# Patient Record
Sex: FEMALE | Race: WHITE | NOT HISPANIC OR LATINO
[De-identification: names, ages, dates, MRNs, and addresses within clinical notes are randomized per-mention and may not be internally consistent; named-entity substitution may affect disease eponyms.]

---

## 2021-05-06 ENCOUNTER — TRANSCRIPTION ENCOUNTER (OUTPATIENT)
Age: 51
End: 2021-05-06

## 2021-05-10 ENCOUNTER — NON-APPOINTMENT (OUTPATIENT)
Age: 51
End: 2021-05-10

## 2021-05-10 PROBLEM — Z00.00 ENCOUNTER FOR PREVENTIVE HEALTH EXAMINATION: Noted: 2021-05-10

## 2021-05-25 ENCOUNTER — APPOINTMENT (OUTPATIENT)
Dept: ENDOCRINOLOGY | Facility: CLINIC | Age: 51
End: 2021-05-25
Payer: COMMERCIAL

## 2021-05-25 ENCOUNTER — APPOINTMENT (OUTPATIENT)
Dept: CARDIOLOGY | Facility: CLINIC | Age: 51
End: 2021-05-25
Payer: COMMERCIAL

## 2021-05-25 ENCOUNTER — NON-APPOINTMENT (OUTPATIENT)
Age: 51
End: 2021-05-25

## 2021-05-25 VITALS
HEART RATE: 110 BPM | DIASTOLIC BLOOD PRESSURE: 102 MMHG | OXYGEN SATURATION: 98 % | SYSTOLIC BLOOD PRESSURE: 162 MMHG | BODY MASS INDEX: 41.56 KG/M2 | HEIGHT: 58 IN | WEIGHT: 198 LBS

## 2021-05-25 VITALS
HEART RATE: 107 BPM | WEIGHT: 198 LBS | BODY MASS INDEX: 41.56 KG/M2 | TEMPERATURE: 98.2 F | HEIGHT: 58 IN | SYSTOLIC BLOOD PRESSURE: 145 MMHG | OXYGEN SATURATION: 98 % | DIASTOLIC BLOOD PRESSURE: 85 MMHG

## 2021-05-25 DIAGNOSIS — J45.998 OTHER ASTHMA: ICD-10-CM

## 2021-05-25 DIAGNOSIS — Z83.3 FAMILY HISTORY OF DIABETES MELLITUS: ICD-10-CM

## 2021-05-25 DIAGNOSIS — Z87.891 PERSONAL HISTORY OF NICOTINE DEPENDENCE: ICD-10-CM

## 2021-05-25 DIAGNOSIS — Z87.2 PERSONAL HISTORY OF DISEASES OF THE SKIN AND SUBCUTANEOUS TISSUE: ICD-10-CM

## 2021-05-25 DIAGNOSIS — E07.9 DISORDER OF THYROID, UNSPECIFIED: ICD-10-CM

## 2021-05-25 DIAGNOSIS — Z78.9 OTHER SPECIFIED HEALTH STATUS: ICD-10-CM

## 2021-05-25 PROCEDURE — 99204 OFFICE O/P NEW MOD 45 MIN: CPT

## 2021-05-25 PROCEDURE — 93000 ELECTROCARDIOGRAM COMPLETE: CPT

## 2021-05-25 PROCEDURE — 99072 ADDL SUPL MATRL&STAF TM PHE: CPT

## 2021-05-25 PROCEDURE — 99214 OFFICE O/P EST MOD 30 MIN: CPT

## 2021-05-25 NOTE — HISTORY OF PRESENT ILLNESS
[FreeTextEntry1] : Ms. MARTINEZ  is a 51 year old  female  who presents for initial endocrine evaluation for diabetes type 2 courtesy Dr Sorin King from [facility].She presents with regard to a history of Diabetes since 1993. Maternal grandparents had diabetes. There is no known history of retinopathy, nephropathy. With regard to neuropathy, she does note twitching of left hand. Today's Hba1c returned at  12.3% and POCT bg  194 mg/dl. Patient is currently not on any dm medication. There has been no significant hypoglycemia. She denies any chest pain, sob, neurologic or ophthalmologic complaints. She too denies any new podiatric concerns. She is not  up to date with her ophthalmologic visit.\par May 5 th went to [facility] for shoulder pain radiating to both shoulder, BG was taken 301 mg/ dl . EKG was negative. \par In the past, patient was on metformin in the past, however had a lot of GI issues. Patient was on insulin while attempting  to conceive. ? byetta inj, stopped due increase  sugar craving.\par \par Patient has started intermittent fasting, only having dinner. \par Pt shows interest in Jardiance. \par Admits to adequate intake of water. \par Additional medical history includes that of elevated BP and hypothyroidism. She was on LT4 in the past. \par Placed on labetalol per Dr. Sung. \par Since april 2021- left hand twitches\par \par FMHx: mother had hx of thyroid disease\par \par SHx: works at [employer], does move somewhat throughout the day.

## 2021-05-27 ENCOUNTER — NON-APPOINTMENT (OUTPATIENT)
Age: 51
End: 2021-05-27

## 2021-05-28 ENCOUNTER — APPOINTMENT (OUTPATIENT)
Dept: ENDOCRINOLOGY | Facility: CLINIC | Age: 51
End: 2021-05-28
Payer: COMMERCIAL

## 2021-05-28 ENCOUNTER — NON-APPOINTMENT (OUTPATIENT)
Age: 51
End: 2021-05-28

## 2021-05-28 ENCOUNTER — APPOINTMENT (OUTPATIENT)
Dept: CARDIOLOGY | Facility: CLINIC | Age: 51
End: 2021-05-28
Payer: COMMERCIAL

## 2021-05-28 VITALS — SYSTOLIC BLOOD PRESSURE: 138 MMHG | DIASTOLIC BLOOD PRESSURE: 84 MMHG

## 2021-05-28 VITALS
OXYGEN SATURATION: 98 % | HEIGHT: 58 IN | TEMPERATURE: 98.3 F | SYSTOLIC BLOOD PRESSURE: 124 MMHG | WEIGHT: 198 LBS | BODY MASS INDEX: 41.56 KG/M2 | HEART RATE: 92 BPM | DIASTOLIC BLOOD PRESSURE: 82 MMHG

## 2021-05-28 LAB
ALBUMIN SERPL ELPH-MCNC: 4.5 G/DL
ALDOSTERONE SERUM: 7.3 NG/DL
ALP BLD-CCNC: 91 U/L
ALT SERPL-CCNC: 9 U/L
ANION GAP SERPL CALC-SCNC: 19 MMOL/L
APPEARANCE: CLEAR
AST SERPL-CCNC: 19 U/L
BACTERIA: NEGATIVE
BASOPHILS # BLD AUTO: 0.08 K/UL
BASOPHILS NFR BLD AUTO: 0.5 %
BILIRUB SERPL-MCNC: 0.5 MG/DL
BILIRUBIN URINE: NEGATIVE
BLOOD URINE: NEGATIVE
BUN SERPL-MCNC: 12 MG/DL
CA-I SERPL-SCNC: 1.24 MMOL/L
CALCIUM SERPL-MCNC: 10 MG/DL
CALCIUM SERPL-MCNC: 10.1 MG/DL
CHLORIDE SERPL-SCNC: 101 MMOL/L
CHOLEST SERPL-MCNC: 245 MG/DL
CO2 SERPL-SCNC: 18 MMOL/L
COLOR: YELLOW
COVID-19 NUCLEOCAPSID  GAM ANTIBODY INTERPRETATION: POSITIVE
CREAT SERPL-MCNC: 0.49 MG/DL
CREAT SPEC-SCNC: 77 MG/DL
CREAT/PROT UR: 0.1 RATIO
EOSINOPHIL # BLD AUTO: 0.21 K/UL
EOSINOPHIL NFR BLD AUTO: 1.3 %
ESTIMATED AVERAGE GLUCOSE: 283 MG/DL
GLUCOSE QUALITATIVE U: NEGATIVE
GLUCOSE SERPL-MCNC: 189 MG/DL
HBA1C MFR BLD HPLC: 11.5 %
HCT VFR BLD CALC: 48.9 %
HDLC SERPL-MCNC: 67 MG/DL
HGB BLD-MCNC: 16.5 G/DL
HYALINE CASTS: 3 /LPF
IMM GRANULOCYTES NFR BLD AUTO: 0.3 %
KETONES URINE: ABNORMAL
LDLC SERPL CALC-MCNC: 166 MG/DL
LDLC SERPL DIRECT ASSAY-MCNC: 160 MG/DL
LEUKOCYTE ESTERASE URINE: NEGATIVE
LYMPHOCYTES # BLD AUTO: 3.76 K/UL
LYMPHOCYTES NFR BLD AUTO: 22.5 %
MAGNESIUM SERPL-MCNC: 1.9 MG/DL
MAN DIFF?: NORMAL
MCHC RBC-ENTMCNC: 27.5 PG
MCHC RBC-ENTMCNC: 33.7 GM/DL
MCV RBC AUTO: 81.6 FL
MICROSCOPIC-UA: NORMAL
MONOCYTES # BLD AUTO: 0.89 K/UL
MONOCYTES NFR BLD AUTO: 5.3 %
NEUTROPHILS # BLD AUTO: 11.7 K/UL
NEUTROPHILS NFR BLD AUTO: 70.1 %
NITRITE URINE: NEGATIVE
NONHDLC SERPL-MCNC: 179 MG/DL
PARATHYROID HORMONE INTACT: 31 PG/ML
PH URINE: 6
PLATELET # BLD AUTO: 416 K/UL
POTASSIUM SERPL-SCNC: 3.9 MMOL/L
PROT SERPL-MCNC: 7.5 G/DL
PROT UR-MCNC: 7 MG/DL
PROTEIN URINE: NEGATIVE
RBC # BLD: 5.99 M/UL
RBC # FLD: 12.3 %
RED BLOOD CELLS URINE: 1 /HPF
RENIN PLASMA: 3.8 PG/ML
SARS-COV-2 AB SERPL QL IA: 40.7 INDEX
SODIUM SERPL-SCNC: 138 MMOL/L
SPECIFIC GRAVITY URINE: 1.01
SQUAMOUS EPITHELIAL CELLS: 3 /HPF
T3FREE SERPL-MCNC: 3.39 PG/ML
T4 FREE SERPL-MCNC: 1.4 NG/DL
TRIGL SERPL-MCNC: 64 MG/DL
TSH SERPL-ACNC: 9.34 UIU/ML
UROBILINOGEN URINE: NORMAL
WBC # FLD AUTO: 16.69 K/UL
WHITE BLOOD CELLS URINE: 1 /HPF

## 2021-05-28 PROCEDURE — 99072 ADDL SUPL MATRL&STAF TM PHE: CPT

## 2021-05-28 PROCEDURE — 82962 GLUCOSE BLOOD TEST: CPT

## 2021-05-28 PROCEDURE — 99214 OFFICE O/P EST MOD 30 MIN: CPT

## 2021-05-28 RX ORDER — EMPAGLIFLOZIN AND METFORMIN HYDROCHLORIDE 12.5; 5 MG/1; MG/1
12.5-5 TABLET ORAL
Qty: 90 | Refills: 1 | Status: DISCONTINUED | COMMUNITY
Start: 2021-05-25 | End: 2021-05-28

## 2021-05-28 RX ORDER — LABETALOL HYDROCHLORIDE 200 MG/1
200 TABLET, FILM COATED ORAL
Qty: 60 | Refills: 3 | Status: DISCONTINUED | COMMUNITY
Start: 2021-05-25 | End: 2021-05-28

## 2021-05-28 NOTE — HISTORY OF PRESENT ILLNESS
[FreeTextEntry1] : Pt presents today as a new patient. She was experiencing back pain in the beginning of May and went to urgent care and was advised to see cardiologist because her BP was elevated and EKG showed Sr with PRWP. Pt states she does not have a history of elevated BP. \par Pt has also been experiencing chest discomfort on swallowing for a couple days. She last had an episode 3 weeks ago. Pt has never had an EGD or seen a GI.\par Pt has PMH of DM with Bg of 300 at her urgent care visit. She states she has not been on medication for years. She will be starting with a new PCP Dr. Lombardi in June. Pt also has a history of thryoid disease but has not been on medication for many years. \par Pt had mild covid in January. Did not yet receive covid vaccine. \par

## 2021-06-01 NOTE — HISTORY OF PRESENT ILLNESS
[FreeTextEntry1] : Ms. MARTINEZ is a 51 year old female who presents with regard to a history of Diabetes since 1993. Maternal grandparents had diabetes. There is no known history of retinopathy, nephropathy. With regard to neuropathy, she does note twitching of left hand. Recent Hba1c returned at 12.3%. Patient is currently on glimepiride 2 mg daily since yesterday. She was on Synjardy 12.5-500 mg, however, was unable to tolerate it. There has been no significant hypoglycemia. She denies any chest pain, sob, neurologic or ophthalmologic complaints. She too denies any new podiatric concerns. She is not up to date with her ophthalmologic visit.\par Patient took Synjardy on Wednesday night after dinner, she had diarrhea lasting for about 2 hours several hours [2-3 hours] after taking Synjdary. Too, had cramps and nausea. \par Patient denies GI upset on glimepiride. \par Bg yesterday before dinner 106, 2 hours post-dinner 206. Bg this am 156. \par POCT glucose returned today at 136  mg/dL\par \par In the past, patient was on metformin -however had GI sdie effects and stopped. Patient was on insulin while attempting to conceive. ? did use  byetta inj, stopped due increased sugar craving.\par \par Patient has started intermittent fasting, only having dinner. \par Admits to adequate intake of water. \par \par Labs from 05/25/21 revealed TSH at 9.34, vitamin D 25-OH at 25.1\par \par Additional medical history includes that of elevated BP and hypothyroidism. She was on LT4 in the past. Taking multi-vitmain. \par On labetalol, took Labetalol on Wednesday at 8:45 am and within 2 hours patient noted "tingling pain" of lower abdominal region and of upper femoral region. Patient took another dose on Wednesday night. Has been off since then. Is to discuss with Dr. Sung today.\par \par \par

## 2021-06-03 ENCOUNTER — NON-APPOINTMENT (OUTPATIENT)
Age: 51
End: 2021-06-03

## 2021-06-03 LAB
CORTICOSTEROID BIND GLOBULIN: 2.5 MG/DL
CORTIS SERPL-MCNC: 10 UG/DL
CORTISOL, FREE: 0.45 UG/DL
PFCX: 4.5 %
VIT B6 SERPL-MCNC: 24.7 UG/L

## 2021-06-09 ENCOUNTER — NON-APPOINTMENT (OUTPATIENT)
Age: 51
End: 2021-06-09

## 2021-06-14 ENCOUNTER — APPOINTMENT (OUTPATIENT)
Dept: FAMILY MEDICINE | Facility: CLINIC | Age: 51
End: 2021-06-14
Payer: COMMERCIAL

## 2021-06-14 VITALS
WEIGHT: 197 LBS | HEART RATE: 100 BPM | SYSTOLIC BLOOD PRESSURE: 142 MMHG | OXYGEN SATURATION: 98 % | DIASTOLIC BLOOD PRESSURE: 92 MMHG | BODY MASS INDEX: 41.17 KG/M2

## 2021-06-14 DIAGNOSIS — Z00.00 ENCOUNTER FOR GENERAL ADULT MEDICAL EXAMINATION W/OUT ABNORMAL FINDINGS: ICD-10-CM

## 2021-06-14 DIAGNOSIS — Z83.3 FAMILY HISTORY OF DIABETES MELLITUS: ICD-10-CM

## 2021-06-14 PROCEDURE — 99204 OFFICE O/P NEW MOD 45 MIN: CPT

## 2021-06-14 PROCEDURE — 99072 ADDL SUPL MATRL&STAF TM PHE: CPT

## 2021-06-14 RX ORDER — AZITHROMYCIN 250 MG/1
250 TABLET, FILM COATED ORAL
Qty: 6 | Refills: 0 | Status: COMPLETED | COMMUNITY
Start: 2021-01-15

## 2021-06-14 RX ORDER — METHYLPREDNISOLONE 4 MG/1
4 TABLET ORAL
Qty: 21 | Refills: 0 | Status: COMPLETED | COMMUNITY
Start: 2021-01-15

## 2021-06-14 RX ORDER — GUAIFENESIN AND CODEINE PHOSPHATE 10; 100 MG/5ML; MG/5ML
100-10 SOLUTION ORAL
Qty: 300 | Refills: 0 | Status: COMPLETED | COMMUNITY
Start: 2021-01-15

## 2021-06-14 NOTE — ASSESSMENT
[FreeTextEntry1] : CAD\par seeing cardiology, getting calcium score this week\par echocardiogram next week\par seeing cardiology regularly\par \par elevated CBC\par going to hematology july \par \par hypertension\par The patient has a diagnosis of hypertension. Blood work was drawn in office and will be followed.  The diagnosis was discussed with patient and need for medication compliance and possible side affects and risks of noncompliance. Patient was told to adhere to a low salt diet and try to incorporate exercise daily.\par still elevated, increase to 5 and reassess\par \par diabetes\par The diagnosis of diabetes is established with this patient. Previous blood work was reviewed prior to the visit and with the patient at time of visit.  Blood work values were explained as well.  Blood work was drawn in office and results will be reviewed and followed. The patient was counseled on using a low sugar and carbohydrate diet.  Patient was advised to eat small meals high in protein and to exercise regularly. Patient as advised to take all medications as prescribed, compliance with medications was reinforced.  Patient should follow up with yearly podiatry and opthalmology visits. Patient was advised to call office  or go to the ER immediately if experiences any nausea, lightheadedness or for any other issues.\par was diabetic, weaned off all her meds, then just restarted with endo\par a1c is 11.5\par does not do well with metformin\par \par cholesterol\par The diagnosis of high cholesterol is established and known to the patient and provider.  Previous blood work was evaluated at time of visit and reviewed with the patient. Blood work was drawn in office and results will be reviewed and followed. The patient was counseled on a low cholesterol diet and on medication compliance. Patient was advised to generally limit foods fried in oil, high in saturated fat, cheese, eggs and red meat. Patient was advised to continue on current medications and to followup in office for necessary blood work in three months.\par reviewed bw with patient - on crestor 5\par \par thyroid\par Patient had a diagnosis of thyroid disorder. Blood was drawn to assess levels of TSH and T4. Patient will continue on current dose of medications at this time unless instructed otherwise. Patient was advised to take thyroid medications on a empty stomach and to wait at least 45 minutes before eating for appropriate absorption.\par was not on medications, now back on synthroid

## 2021-06-14 NOTE — HEALTH RISK ASSESSMENT
[Very Good] : ~his/her~  mood as very good [] : No [30 or more] : 30 or more [Yes] : Yes [No falls in past year] : Patient reported no falls in the past year [0] : 2) Feeling down, depressed, or hopeless: Not at all (0) [YearQuit] : 2004 [TZA9Njomb] : 0 [de-identified] : rarely [With Significant Other] : lives with significant other [Employed] : employed [] :  [# Of Children ___] : has [unfilled] children [Fully functional (bathing, dressing, toileting, transferring, walking, feeding)] : Fully functional (bathing, dressing, toileting, transferring, walking, feeding) [Fully functional (using the telephone, shopping, preparing meals, housekeeping, doing laundry, using] : Fully functional and needs no help or supervision to perform IADLs (using the telephone, shopping, preparing meals, housekeeping, doing laundry, using transportation, managing medications and managing finances) [de-identified] : [facility] [place] lab

## 2021-06-14 NOTE — HISTORY OF PRESENT ILLNESS
[de-identified] : 51 year old female here to establish care and for a full physical examination. The patients complete medical, family and social history was documented and reviewed with the patient.  The patients medications were identified and also reviewed with the patient as well as any allergies to any medications. All active and previous medical problems were identified and discussed with the patient.  Any new problems were documented. \par

## 2021-06-17 ENCOUNTER — APPOINTMENT (OUTPATIENT)
Dept: CT IMAGING | Facility: CLINIC | Age: 51
End: 2021-06-17
Payer: SELF-PAY

## 2021-06-17 ENCOUNTER — OUTPATIENT (OUTPATIENT)
Dept: OUTPATIENT SERVICES | Facility: HOSPITAL | Age: 51
LOS: 1 days | End: 2021-06-17
Payer: SELF-PAY

## 2021-06-17 DIAGNOSIS — E78.5 HYPERLIPIDEMIA, UNSPECIFIED: ICD-10-CM

## 2021-06-17 PROCEDURE — 75571 CT HRT W/O DYE W/CA TEST: CPT | Mod: 26

## 2021-06-17 PROCEDURE — 75571 CT HRT W/O DYE W/CA TEST: CPT

## 2021-06-17 NOTE — HISTORY OF PRESENT ILLNESS
[FreeTextEntry1] : Pt presents for acute visit. States that she started Labetalol 200 mg on Wednesday, and about two hours after taking the medication she started experiencing a tingling sensation in her groin and her legs. Pt has been holding Labetalol since yesterday. Pt states tingling sensation stopped when she stopped the medication. Pt denies any chest pain, shortness of breath, dizziness, headache, palpitations, cough or fever. Pt took her BP this morning which was 124/82. \par Pt had elevated WBC on CBC, pt states this is chronic for her but she has never seen a hematologist.\par Chol. 245, -pt not currently on meds\par TSH 9.34, A1C 11.5-Pt started on medication- followed by Dr. Randle

## 2021-06-21 ENCOUNTER — APPOINTMENT (OUTPATIENT)
Dept: CARDIOLOGY | Facility: CLINIC | Age: 51
End: 2021-06-21
Payer: COMMERCIAL

## 2021-06-21 ENCOUNTER — APPOINTMENT (OUTPATIENT)
Dept: ENDOCRINOLOGY | Facility: CLINIC | Age: 51
End: 2021-06-21
Payer: COMMERCIAL

## 2021-06-21 ENCOUNTER — NON-APPOINTMENT (OUTPATIENT)
Age: 51
End: 2021-06-21

## 2021-06-21 VITALS
SYSTOLIC BLOOD PRESSURE: 125 MMHG | HEIGHT: 58 IN | DIASTOLIC BLOOD PRESSURE: 90 MMHG | HEART RATE: 97 BPM | BODY MASS INDEX: 41.35 KG/M2 | OXYGEN SATURATION: 98 % | TEMPERATURE: 97.6 F | WEIGHT: 197 LBS

## 2021-06-21 VITALS — SYSTOLIC BLOOD PRESSURE: 124 MMHG | DIASTOLIC BLOOD PRESSURE: 84 MMHG

## 2021-06-21 VITALS
TEMPERATURE: 97.6 F | WEIGHT: 197 LBS | HEART RATE: 97 BPM | BODY MASS INDEX: 41.35 KG/M2 | DIASTOLIC BLOOD PRESSURE: 90 MMHG | HEIGHT: 58 IN | OXYGEN SATURATION: 98 % | SYSTOLIC BLOOD PRESSURE: 125 MMHG

## 2021-06-21 DIAGNOSIS — I10 ESSENTIAL (PRIMARY) HYPERTENSION: ICD-10-CM

## 2021-06-21 DIAGNOSIS — R79.89 OTHER SPECIFIED ABNORMAL FINDINGS OF BLOOD CHEMISTRY: ICD-10-CM

## 2021-06-21 LAB
METANEPHRINE, PL: 37.9 PG/ML
NORMETANEPHRINE, PL: 237 PG/ML

## 2021-06-21 PROCEDURE — 95250 CONT GLUC MNTR PHYS/QHP EQP: CPT

## 2021-06-21 PROCEDURE — 99072 ADDL SUPL MATRL&STAF TM PHE: CPT

## 2021-06-21 PROCEDURE — 93306 TTE W/DOPPLER COMPLETE: CPT

## 2021-06-21 PROCEDURE — 93000 ELECTROCARDIOGRAM COMPLETE: CPT

## 2021-06-21 PROCEDURE — 99214 OFFICE O/P EST MOD 30 MIN: CPT

## 2021-06-21 NOTE — HISTORY OF PRESENT ILLNESS
[FreeTextEntry1] : Ms. MARTINEZ is a 51 year old female who returns ith regard to a history of type 2  Diabetes since 1993. There is no known history of retinopathy, nephropathy. With regard to neuropathy, she does note twitching of left hand.Patient is currently on glimepiride 2 mg daily and Jardiance 10 mg daily-unable to tolerate Synjardy.There has been no significant hypoglycemia. She denies any chest pain, sob, neurologic or ophthalmologic complaints. She too denies any new podiatric concerns. She is not up to date with her ophthalmologic visit.\par Patient denies GI upset on glimepiride. \par In the past, patient was on metformin -however had GI sdie effects and stopped. Patient was on insulin while attempting to conceive. ? did use  byetta inj, stopped due increased sugar craving.\par Patient has followed intermittent fasting, only having dinner. \par Admits to adequate intake of water. \par Labs from 05/25/21 revealed TSH at 9.34, vitamin D 25-OH at 25.1 Now on LT4 50 mcg and wekly vit d.\par Additional medical history includes that of elevated BP and hypothyroidism. DOes take the LT4 noted above along with Amlodipine -couldn't tolerate Labetalol.\par \par \par

## 2021-06-22 LAB
ALBUMIN SERPL ELPH-MCNC: 4.5 G/DL
ALP BLD-CCNC: 75 U/L
ALT SERPL-CCNC: 12 U/L
ANION GAP SERPL CALC-SCNC: 12 MMOL/L
AST SERPL-CCNC: 15 U/L
BASOPHILS # BLD AUTO: 0.06 K/UL
BASOPHILS NFR BLD AUTO: 0.4 %
BILIRUB DIRECT SERPL-MCNC: 0.2 MG/DL
BILIRUB INDIRECT SERPL-MCNC: 0.4 MG/DL
BILIRUB SERPL-MCNC: 0.5 MG/DL
BUN SERPL-MCNC: 10 MG/DL
CALCIUM SERPL-MCNC: 9.9 MG/DL
CHLORIDE SERPL-SCNC: 105 MMOL/L
CHOLEST SERPL-MCNC: 128 MG/DL
CK SERPL-CCNC: 26 U/L
CO2 SERPL-SCNC: 22 MMOL/L
COVID-19 NUCLEOCAPSID  GAM ANTIBODY INTERPRETATION: POSITIVE
CREAT SERPL-MCNC: 0.49 MG/DL
EOSINOPHIL # BLD AUTO: 0.21 K/UL
EOSINOPHIL NFR BLD AUTO: 1.4 %
ESTIMATED AVERAGE GLUCOSE: 217 MG/DL
GLUCOSE SERPL-MCNC: 79 MG/DL
HBA1C MFR BLD HPLC: 9.2 %
HCT VFR BLD CALC: 46.8 %
HDLC SERPL-MCNC: 45 MG/DL
HGB BLD-MCNC: 15.2 G/DL
IMM GRANULOCYTES NFR BLD AUTO: 0.4 %
LDLC SERPL CALC-MCNC: 73 MG/DL
LDLC SERPL DIRECT ASSAY-MCNC: 63 MG/DL
LYMPHOCYTES # BLD AUTO: 3.46 K/UL
LYMPHOCYTES NFR BLD AUTO: 22.3 %
MAN DIFF?: NORMAL
MCHC RBC-ENTMCNC: 27.2 PG
MCHC RBC-ENTMCNC: 32.5 GM/DL
MCV RBC AUTO: 83.9 FL
MONOCYTES # BLD AUTO: 0.8 K/UL
MONOCYTES NFR BLD AUTO: 5.2 %
NEUTROPHILS # BLD AUTO: 10.93 K/UL
NEUTROPHILS NFR BLD AUTO: 70.3 %
NONHDLC SERPL-MCNC: 83 MG/DL
PLATELET # BLD AUTO: 371 K/UL
POTASSIUM SERPL-SCNC: 3.7 MMOL/L
PROT SERPL-MCNC: 7.2 G/DL
RBC # BLD: 5.58 M/UL
RBC # FLD: 13 %
SARS-COV-2 AB SERPL QL IA: 33.6 INDEX
SODIUM SERPL-SCNC: 139 MMOL/L
T3FREE SERPL-MCNC: 2.53 PG/ML
T4 FREE SERPL-MCNC: 1.5 NG/DL
TRIGL SERPL-MCNC: 52 MG/DL
TSH SERPL-ACNC: 3.67 UIU/ML
WBC # FLD AUTO: 15.52 K/UL

## 2021-06-30 ENCOUNTER — APPOINTMENT (OUTPATIENT)
Dept: FAMILY MEDICINE | Facility: CLINIC | Age: 51
End: 2021-06-30
Payer: COMMERCIAL

## 2021-06-30 ENCOUNTER — OUTPATIENT (OUTPATIENT)
Dept: OUTPATIENT SERVICES | Facility: HOSPITAL | Age: 51
LOS: 1 days | End: 2021-06-30
Payer: COMMERCIAL

## 2021-06-30 ENCOUNTER — APPOINTMENT (OUTPATIENT)
Dept: RADIOLOGY | Facility: IMAGING CENTER | Age: 51
End: 2021-06-30
Payer: COMMERCIAL

## 2021-06-30 ENCOUNTER — TRANSCRIPTION ENCOUNTER (OUTPATIENT)
Age: 51
End: 2021-06-30

## 2021-06-30 ENCOUNTER — RESULT REVIEW (OUTPATIENT)
Age: 51
End: 2021-06-30

## 2021-06-30 VITALS
OXYGEN SATURATION: 97 % | DIASTOLIC BLOOD PRESSURE: 94 MMHG | SYSTOLIC BLOOD PRESSURE: 129 MMHG | BODY MASS INDEX: 40.51 KG/M2 | HEART RATE: 97 BPM | WEIGHT: 193 LBS | HEIGHT: 58 IN

## 2021-06-30 DIAGNOSIS — M94.0 CHONDROCOSTAL JUNCTION SYNDROME [TIETZE]: ICD-10-CM

## 2021-06-30 PROCEDURE — 71100 X-RAY EXAM RIBS UNI 2 VIEWS: CPT | Mod: 26

## 2021-06-30 PROCEDURE — 99072 ADDL SUPL MATRL&STAF TM PHE: CPT

## 2021-06-30 PROCEDURE — 99213 OFFICE O/P EST LOW 20 MIN: CPT

## 2021-06-30 PROCEDURE — 71100 X-RAY EXAM RIBS UNI 2 VIEWS: CPT

## 2021-06-30 RX ORDER — IBUPROFEN 800 MG/1
800 TABLET, FILM COATED ORAL
Qty: 30 | Refills: 0 | Status: ACTIVE | COMMUNITY
Start: 2021-06-30 | End: 1900-01-01

## 2021-06-30 RX ORDER — ERGOCALCIFEROL 1.25 MG/1
1.25 MG CAPSULE, LIQUID FILLED ORAL
Qty: 4 | Refills: 2 | Status: DISCONTINUED | COMMUNITY
Start: 2021-05-28 | End: 2021-06-30

## 2021-06-30 NOTE — PHYSICAL EXAM
[Well Nourished] : well nourished [Well Developed] : well developed [Clear to Auscultation] : lungs were clear to auscultation bilaterally [Regular Rhythm] : with a regular rhythm [Normal S1, S2] : normal S1 and S2 [Normal Gait] : normal gait [Normal Affect] : the affect was normal [Normal Insight/Judgement] : insight and judgment were intact [de-identified] : tenderness to left lower ribs

## 2021-06-30 NOTE — HEALTH RISK ASSESSMENT
[] : No [30 or more] : 30 or more [Yes] : Yes [No falls in past year] : Patient reported no falls in the past year [0] : 2) Feeling down, depressed, or hopeless: Not at all (0) [YearQuit] : 2004 [de-identified] : rarely [[id number]] : 0 [Very Good] : ~his/her~  mood as very good [With Significant Other] : lives with significant other [Employed] : employed [] :  [# Of Children ___] : has [unfilled] children [Fully functional (bathing, dressing, toileting, transferring, walking, feeding)] : Fully functional (bathing, dressing, toileting, transferring, walking, feeding) [Fully functional (using the telephone, shopping, preparing meals, housekeeping, doing laundry, using] : Fully functional and needs no help or supervision to perform IADLs (using the telephone, shopping, preparing meals, housekeeping, doing laundry, using transportation, managing medications and managing finances) [de-identified] : [facility] lab

## 2021-06-30 NOTE — ASSESSMENT
[FreeTextEntry1] : left sided rib pain\par most likely costochondritis\par rib series\par nsaids\par \par CAD\par seeing cardiology, getting calcium score this week\par echocardiogram next week\par seeing cardiology regularly\par \par elevated CBC\par going to hematology july \par \par hypertension\par The patient has a diagnosis of hypertension. Blood work was drawn in office and will be followed.  The diagnosis was discussed with patient and need for medication compliance and possible side affects and risks of noncompliance. Patient was told to adhere to a low salt diet and try to incorporate exercise daily.\par still elevated, increase to 5 and reassess\par \par diabetes\par The diagnosis of diabetes is established with this patient. Previous blood work was reviewed prior to the visit and with the patient at time of visit.  Blood work values were explained as well.  Blood work was drawn in office and results will be reviewed and followed. The patient was counseled on using a low sugar and carbohydrate diet.  Patient was advised to eat small meals high in protein and to exercise regularly. Patient as advised to take all medications as prescribed, compliance with medications was reinforced.  Patient should follow up with yearly podiatry and opthalmology visits. Patient was advised to call office  or go to the ER immediately if experiences any nausea, lightheadedness or for any other issues.\par was diabetic, weaned off all her meds, then just restarted with endo\par a1c is 9.2\par does not do well with metformin\par \par cholesterol\par The diagnosis of high cholesterol is established and known to the patient and provider.  Previous blood work was evaluated at time of visit and reviewed with the patient. Blood work was drawn in office and results will be reviewed and followed. The patient was counseled on a low cholesterol diet and on medication compliance. Patient was advised to generally limit foods fried in oil, high in saturated fat, cheese, eggs and red meat. Patient was advised to continue on current medications and to followup in office for necessary blood work in three months.\par reviewed bw with patient - on crestor 5\par \par thyroid\par Patient had a diagnosis of thyroid disorder. Blood was drawn to assess levels of TSH and T4. Patient will continue on current dose of medications at this time unless instructed otherwise. Patient was advised to take thyroid medications on a empty stomach and to wait at least 45 minutes before eating for appropriate absorption.\par was not on medications, now back on synthroid

## 2021-06-30 NOTE — HISTORY OF PRESENT ILLNESS
[de-identified] : 51 year old female here with complaints of left sided rib pain for a few months, intermittent, no rhyme or reason.  Patients active medications, allergies and issues were all reviewed with the patient at time of visit.\par \par

## 2021-07-06 ENCOUNTER — OUTPATIENT (OUTPATIENT)
Dept: OUTPATIENT SERVICES | Facility: HOSPITAL | Age: 51
LOS: 1 days | Discharge: HOME | End: 2021-07-06

## 2021-07-06 ENCOUNTER — APPOINTMENT (OUTPATIENT)
Dept: ENDOCRINOLOGY | Facility: CLINIC | Age: 51
End: 2021-07-06

## 2021-07-06 DIAGNOSIS — R79.89 OTHER SPECIFIED ABNORMAL FINDINGS OF BLOOD CHEMISTRY: ICD-10-CM

## 2021-07-07 ENCOUNTER — LABORATORY RESULT (OUTPATIENT)
Age: 51
End: 2021-07-07

## 2021-07-07 ENCOUNTER — APPOINTMENT (OUTPATIENT)
Dept: HEMATOLOGY ONCOLOGY | Facility: CLINIC | Age: 51
End: 2021-07-07
Payer: COMMERCIAL

## 2021-07-07 VITALS
HEART RATE: 98 BPM | OXYGEN SATURATION: 98 % | SYSTOLIC BLOOD PRESSURE: 130 MMHG | HEIGHT: 59.29 IN | DIASTOLIC BLOOD PRESSURE: 80 MMHG | BODY MASS INDEX: 39.21 KG/M2 | WEIGHT: 197.09 LBS | RESPIRATION RATE: 18 BRPM | TEMPERATURE: 98.2 F

## 2021-07-07 PROCEDURE — 99072 ADDL SUPL MATRL&STAF TM PHE: CPT

## 2021-07-07 PROCEDURE — 99205 OFFICE O/P NEW HI 60 MIN: CPT

## 2021-07-07 NOTE — ASSESSMENT
[FreeTextEntry1] : 52 yo F with a PMH DM2 (not on insulin), PCOS, HTN, HLD, psoriasis, and hypothyroidism who is seen in consult with leukocytosis and elevated H/H.\par \par # Leukocytosis\par - Chronic, about 15-16K, likely due to PCOS\par - Explained to patient, no further separate management\par - Weight control\par \par # Polycythemia\par - Hb about 15-16\par - Current unknown etiology, r/o malignant causes\par - Will perform iron studies, ferritin, check for PV, hemochromatosis mutation \par \par - RTC in 2-3 months, PRE-F \par

## 2021-07-07 NOTE — HISTORY OF PRESENT ILLNESS
[de-identified] : 50 yo F with a PMH DM2 (not on insulin), PCOS, HTN, HLD, psoriasis, and hypothyroidism who presents with leukocytosis and elevated Hb. She states she has had chronic leukocytosis, usually around 15, since the 1990s. She has more recently noticed elevated Hb. She denies headache or blurry vision, recent significant weight changes, itching, joint pains, fevers, or night sweats. She also has no personal or family history of blood clots or abnormal bleeding. Her hypothyroidism is controlled (normal TSH) and her diabetes is improving.

## 2021-07-07 NOTE — REASON FOR VISIT
[Initial Consultation] : an initial consultation for [Leukocytosis] : leukocytosis [FreeTextEntry2] : Cytosis

## 2021-07-07 NOTE — PHYSICAL EXAM
[Fully active, able to carry on all pre-disease performance without restriction] : Status 0 - Fully active, able to carry on all pre-disease performance without restriction [Obese] : obese [Normal] : affect appropriate [Ulcers] : no ulcers [Mucositis] : no mucositis [Thrush] : no thrush [de-identified] : Dry mucous membranes

## 2021-07-07 NOTE — RESULTS/DATA
[FreeTextEntry1] : 7/7/2021\par Available labs reviewed. \par Pancytosis  on 5/25/21 labs. \par Leukocytosis only in 6/21/2021 labs.

## 2021-07-12 ENCOUNTER — NON-APPOINTMENT (OUTPATIENT)
Age: 51
End: 2021-07-12

## 2021-07-14 RX ORDER — BLOOD SUGAR DIAGNOSTIC
STRIP MISCELLANEOUS 3 TIMES DAILY
Qty: 3 | Refills: 3 | Status: DISCONTINUED | COMMUNITY
Start: 2021-05-25 | End: 2021-07-14

## 2021-07-14 NOTE — HISTORY OF PRESENT ILLNESS
[FreeTextEntry1] : Ms. MARTINEZ is a 51 year old female who returns ith regard to a history of type 2 Diabetes since 1993. There is no known history of retinopathy, nephropathy. With regard to neuropathy, she does note twitching of left hand.Patient is currently on glimepiride 2 mg daily and Jardiance 10 mg daily-unable to tolerate Synjardy. Patient denies GI upset on glimepiride. In the past, patient was on metformin -however had GI sdie effects and stopped. Patient was on insulin while attempting to conceive. ? did use byetta inj, stopped due increased sugar cravingThere has been no significant hypoglycemia. She denies any chest pain, sob, neurologic or ophthalmologic complaints. She too denies any new podiatric concerns. She is not up to date with her ophthalmologic visit. HGM has been running in the 160s in the am,  70s-80s at around 5 pm, 180s 2 hrs after eating. 68 was her lowest value. Patient has followed intermittent fasting, only having dinner for the past 6-8 months.  Admits to adequate intake of water. \par Labs from 05/25/21 revealed TSH at 9.34, vitamin D 25-OH at 25.1 Now on LT4 50 mcg and wekly vit d.\par 5/25/2021 A1c returned at 11.5%\par POCT glucose returned today at  136  mg/dL \par Additional medical history includes that of elevated BP and hypothyroidism. DOes take the LT4 noted above along with Amlodipine -couldn't tolerate Labetalol.

## 2021-07-15 RX ORDER — ASPIRIN ENTERIC COATED TABLETS 81 MG 81 MG/1
81 TABLET, DELAYED RELEASE ORAL DAILY
Refills: 0 | Status: ACTIVE | COMMUNITY
Start: 2021-07-15

## 2021-07-16 ENCOUNTER — NON-APPOINTMENT (OUTPATIENT)
Age: 51
End: 2021-07-16

## 2021-07-16 RX ORDER — CHOLECALCIFEROL (VITAMIN D3) 1250 MCG
1.25 MG CAPSULE ORAL
Qty: 12 | Refills: 0 | Status: DISCONTINUED | COMMUNITY
Start: 2021-07-06 | End: 2021-07-16

## 2021-07-19 ENCOUNTER — APPOINTMENT (OUTPATIENT)
Dept: ORTHOPEDIC SURGERY | Facility: CLINIC | Age: 51
End: 2021-07-19
Payer: COMMERCIAL

## 2021-07-19 ENCOUNTER — NON-APPOINTMENT (OUTPATIENT)
Age: 51
End: 2021-07-19

## 2021-07-19 PROCEDURE — 99072 ADDL SUPL MATRL&STAF TM PHE: CPT

## 2021-07-19 PROCEDURE — 99204 OFFICE O/P NEW MOD 45 MIN: CPT

## 2021-07-28 ENCOUNTER — APPOINTMENT (OUTPATIENT)
Dept: PHYSICAL MEDICINE AND REHAB | Facility: CLINIC | Age: 51
End: 2021-07-28
Payer: COMMERCIAL

## 2021-07-28 DIAGNOSIS — M99.02 SEGMENTAL AND SOMATIC DYSFUNCTION OF THORACIC REGION: ICD-10-CM

## 2021-07-28 DIAGNOSIS — M99.08 SEGMENTAL AND SOMATIC DYSFUNCTION OF RIB CAGE: ICD-10-CM

## 2021-07-28 DIAGNOSIS — M99.07 SEGMENTAL AND SOMATIC DYSFUNCTION OF UPPER EXTREMITY: ICD-10-CM

## 2021-07-28 PROCEDURE — 98926 OSTEOPATH MANJ 3-4 REGIONS: CPT

## 2021-07-28 PROCEDURE — 99072 ADDL SUPL MATRL&STAF TM PHE: CPT

## 2021-07-28 PROCEDURE — 99213 OFFICE O/P EST LOW 20 MIN: CPT | Mod: 25

## 2021-07-28 PROCEDURE — 99203 OFFICE O/P NEW LOW 30 MIN: CPT | Mod: 25

## 2021-07-28 NOTE — PHYSICAL EXAM
[FreeTextEntry1] : Gen: Patient is A&O x 3, NAD\par HEENT: EOMI, hearing grossly normal\par Resp: regular, non - labored\par CV: pulses regular\par Skin: no rashes, erythema\par Lymph: no clubbing, cyanosis, edema, or palpable lymphadenopathy\par Inspection: Posterior thoracic rotation along T6-T7\par ROM: full throughout\par Palpation: TTP left intercostal space T5, T6, T7\par Sensation: intact to light touch\par Reflexes: 1+ and symmetric throughout\par Strength: 5/5 throughout\par Special tests: -Yeboah's sign\par Gait: normal, non-antalgic\par \par Osteopathic Structural Exam:\par Thoracic: TT6-T8 NRLSBR\par Rib: Left ribs 5-7 restricted in exhalation, TTP left anterior rib 6, 7 \par Upper extremity: Left GH joint restricted anterior \par \par \par Physical exam performed with Chaperone in the room.

## 2021-07-28 NOTE — ASSESSMENT
[FreeTextEntry1] : 51 year old female presenting for left chest wall/rib pain.\par \par #Costochondritis \par -Sports medicine notes reviewed.  \par -X-ray of ribs reviewed.\par -Planning for MRI to further evaluate.\par -Discussed risks and benefits of osteopathic manipulation.\par -Osteopathic structural exam demonstrated somatic dysfunction and the patient agreed to osteopathic manipulation.\par  \par \par 1. Somatic dysfunction upper extremity\par --OMT performed with muscle energy \par 2. Somatic dysfunction rib\par --OMT performed with myofascial release, muscle energy and counterstrain \par 3. Somatic dysfunction thoracic \par --OMT performed with muscle energy and myofascial release \par \par Patient tolerated treatment well.\par \par Follow up in 1-2 weeks.  \par \par

## 2021-07-28 NOTE — DATA REVIEWED
[FreeTextEntry1] : x-ray ribs left June 2021: No acute appearing displaced rib fractures or other gross rib pathology.

## 2021-07-28 NOTE — HISTORY OF PRESENT ILLNESS
[FreeTextEntry1] : Ms. Poole is a 51 year old female who presents with left sided rib pain right underneath her breast line. Pain started suddenly in April 2021 with no inciting events, sometime radiates towards sternum, sometimes posteriorly. Pain initially comes and goes, but now near constant.  It is sharp/burning pressure pain. She was evaluated by her PMD who dx her w/ Costochondritis, as rib xrays were negative for fractures or gulshan pathologies, and cardiac work up was unremarkable. She was started on 10 day course of Ibuprofen 800mg TID with no improvement.  She was then started on steroids and is taking tramadol as needed for pain.\par \par She denies any weakness, numbness or tingling or bowel/bladder problems.

## 2021-08-01 ENCOUNTER — EMERGENCY (EMERGENCY)
Facility: HOSPITAL | Age: 51
LOS: 0 days | Discharge: HOME | End: 2021-08-01
Attending: EMERGENCY MEDICINE
Payer: COMMERCIAL

## 2021-08-01 VITALS
SYSTOLIC BLOOD PRESSURE: 145 MMHG | RESPIRATION RATE: 16 BRPM | DIASTOLIC BLOOD PRESSURE: 83 MMHG | HEART RATE: 105 BPM | TEMPERATURE: 98 F | HEIGHT: 59 IN | OXYGEN SATURATION: 98 % | WEIGHT: 195.11 LBS

## 2021-08-01 VITALS
DIASTOLIC BLOOD PRESSURE: 78 MMHG | HEART RATE: 85 BPM | OXYGEN SATURATION: 99 % | SYSTOLIC BLOOD PRESSURE: 135 MMHG | RESPIRATION RATE: 18 BRPM | TEMPERATURE: 99 F

## 2021-08-01 DIAGNOSIS — Z88.1 ALLERGY STATUS TO OTHER ANTIBIOTIC AGENTS STATUS: ICD-10-CM

## 2021-08-01 DIAGNOSIS — E28.2 POLYCYSTIC OVARIAN SYNDROME: ICD-10-CM

## 2021-08-01 DIAGNOSIS — E03.9 HYPOTHYROIDISM, UNSPECIFIED: ICD-10-CM

## 2021-08-01 DIAGNOSIS — R07.9 CHEST PAIN, UNSPECIFIED: ICD-10-CM
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
## 2021-08-01 DIAGNOSIS — S39.012A STRAIN OF MUSCLE, FASCIA AND TENDON OF LOWER BACK, INITIAL ENCOUNTER: ICD-10-CM
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
## 2021-08-01 DIAGNOSIS — I10 ESSENTIAL (PRIMARY) HYPERTENSION: ICD-10-CM

## 2021-08-01 DIAGNOSIS — E11.9 TYPE 2 DIABETES MELLITUS WITHOUT COMPLICATIONS: ICD-10-CM

## 2021-08-01 LAB
ALBUMIN SERPL ELPH-MCNC: 3.4 G/DL — SIGNIFICANT CHANGE UP (ref 3.3–5)
ALP SERPL-CCNC: 70 U/L — SIGNIFICANT CHANGE UP (ref 40–120)
ALT FLD-CCNC: 19 U/L — SIGNIFICANT CHANGE UP (ref 12–78)
ANION GAP SERPL CALC-SCNC: 4 MMOL/L — LOW (ref 5–17)
APPEARANCE UR: CLEAR — SIGNIFICANT CHANGE UP
AST SERPL-CCNC: 10 U/L — LOW (ref 15–37)
BACTERIA # UR AUTO: ABNORMAL
BASOPHILS # BLD AUTO: 0.06 K/UL — SIGNIFICANT CHANGE UP (ref 0–0.2)
BASOPHILS NFR BLD AUTO: 0.4 % — SIGNIFICANT CHANGE UP (ref 0–2)
BILIRUB SERPL-MCNC: 0.3 MG/DL — SIGNIFICANT CHANGE UP (ref 0.2–1.2)
BILIRUB UR-MCNC: NEGATIVE — SIGNIFICANT CHANGE UP
BUN SERPL-MCNC: 16 MG/DL — SIGNIFICANT CHANGE UP (ref 7–23)
CALCIUM SERPL-MCNC: 9 MG/DL — SIGNIFICANT CHANGE UP (ref 8.5–10.1)
CHLORIDE SERPL-SCNC: 112 MMOL/L — HIGH (ref 96–108)
CO2 SERPL-SCNC: 28 MMOL/L — SIGNIFICANT CHANGE UP (ref 22–31)
COLOR SPEC: YELLOW — SIGNIFICANT CHANGE UP
CREAT SERPL-MCNC: 0.67 MG/DL — SIGNIFICANT CHANGE UP (ref 0.5–1.3)
DIFF PNL FLD: ABNORMAL
EOSINOPHIL # BLD AUTO: 0.28 K/UL — SIGNIFICANT CHANGE UP (ref 0–0.5)
EOSINOPHIL NFR BLD AUTO: 1.8 % — SIGNIFICANT CHANGE UP (ref 0–6)
EPI CELLS # UR: SIGNIFICANT CHANGE UP
GLUCOSE SERPL-MCNC: 156 MG/DL — HIGH (ref 70–99)
GLUCOSE UR QL: 1000 MG/DL
HCT VFR BLD CALC: 43 % — SIGNIFICANT CHANGE UP (ref 34.5–45)
HGB BLD-MCNC: 14.2 G/DL — SIGNIFICANT CHANGE UP (ref 11.5–15.5)
IMM GRANULOCYTES NFR BLD AUTO: 0.5 % — SIGNIFICANT CHANGE UP (ref 0–1.5)
KETONES UR-MCNC: NEGATIVE — SIGNIFICANT CHANGE UP
LEUKOCYTE ESTERASE UR-ACNC: NEGATIVE — SIGNIFICANT CHANGE UP
LYMPHOCYTES # BLD AUTO: 17.5 % — SIGNIFICANT CHANGE UP (ref 13–44)
LYMPHOCYTES # BLD AUTO: 2.67 K/UL — SIGNIFICANT CHANGE UP (ref 1–3.3)
MCHC RBC-ENTMCNC: 27.7 PG — SIGNIFICANT CHANGE UP (ref 27–34)
MCHC RBC-ENTMCNC: 33 GM/DL — SIGNIFICANT CHANGE UP (ref 32–36)
MCV RBC AUTO: 84 FL — SIGNIFICANT CHANGE UP (ref 80–100)
MONOCYTES # BLD AUTO: 0.91 K/UL — HIGH (ref 0–0.9)
MONOCYTES NFR BLD AUTO: 6 % — SIGNIFICANT CHANGE UP (ref 2–14)
NEUTROPHILS # BLD AUTO: 11.23 K/UL — HIGH (ref 1.8–7.4)
NEUTROPHILS NFR BLD AUTO: 73.8 % — SIGNIFICANT CHANGE UP (ref 43–77)
NITRITE UR-MCNC: NEGATIVE — SIGNIFICANT CHANGE UP
NRBC # BLD: 0 /100 WBCS — SIGNIFICANT CHANGE UP (ref 0–0)
PH UR: 6 — SIGNIFICANT CHANGE UP (ref 5–8)
PLATELET # BLD AUTO: 344 K/UL — SIGNIFICANT CHANGE UP (ref 150–400)
POTASSIUM SERPL-MCNC: 3.9 MMOL/L — SIGNIFICANT CHANGE UP (ref 3.5–5.3)
POTASSIUM SERPL-SCNC: 3.9 MMOL/L — SIGNIFICANT CHANGE UP (ref 3.5–5.3)
PROT SERPL-MCNC: 6.9 GM/DL — SIGNIFICANT CHANGE UP (ref 6–8.3)
PROT UR-MCNC: NEGATIVE MG/DL — SIGNIFICANT CHANGE UP
RBC # BLD: 5.12 M/UL — SIGNIFICANT CHANGE UP (ref 3.8–5.2)
RBC # FLD: 12.8 % — SIGNIFICANT CHANGE UP (ref 10.3–14.5)
RBC CASTS # UR COMP ASSIST: >50 /HPF (ref 0–4)
SODIUM SERPL-SCNC: 144 MMOL/L — SIGNIFICANT CHANGE UP (ref 135–145)
SP GR SPEC: 1.01 — SIGNIFICANT CHANGE UP (ref 1.01–1.02)
TROPONIN I SERPL-MCNC: <.015 NG/ML — SIGNIFICANT CHANGE UP (ref 0.01–0.04)
UROBILINOGEN FLD QL: NEGATIVE MG/DL — SIGNIFICANT CHANGE UP
WBC # BLD: 15.22 K/UL — HIGH (ref 3.8–10.5)
WBC # FLD AUTO: 15.22 K/UL — HIGH (ref 3.8–10.5)
WBC UR QL: SIGNIFICANT CHANGE UP

## 2021-08-01 PROCEDURE — 71045 X-RAY EXAM CHEST 1 VIEW: CPT | Mod: 26

## 2021-08-01 PROCEDURE — 72100 X-RAY EXAM L-S SPINE 2/3 VWS: CPT | Mod: 26

## 2021-08-01 PROCEDURE — 99285 EMERGENCY DEPT VISIT HI MDM: CPT

## 2021-08-01 RX ORDER — KETOROLAC TROMETHAMINE 30 MG/ML
30 SYRINGE (ML) INJECTION ONCE
Refills: 0 | Status: DISCONTINUED | OUTPATIENT
Start: 2021-08-01 | End: 2021-08-01

## 2021-08-01 RX ORDER — METHOCARBAMOL 500 MG/1
2 TABLET, FILM COATED ORAL
Qty: 30 | Refills: 0
Start: 2021-08-01 | End: 2021-08-05

## 2021-08-01 RX ORDER — TRAMADOL HYDROCHLORIDE 50 MG/1
1 TABLET ORAL
Qty: 12 | Refills: 0
Start: 2021-08-01 | End: 2021-08-03

## 2021-08-01 RX ADMIN — Medication 30 MILLIGRAM(S): at 16:54

## 2021-08-01 NOTE — ED ADULT NURSE NOTE - FINAL NURSING ELECTRONIC SIGNATURE
83-year-old female with intrathoracic stomach, hiatal hernia with Nish's ulcers found in March 2021, Presented with dyspnea on exertion, found with normocytic anemia with hemoglobin of 7.1 MCV of 88.6 on admission. ? recurrent bleeding from [place] ulcers/HH?    -Check Iron panel, Vitamin B12 level, folate level, reticulocyte, TSH (ordered)  -Regular diet  -No plans for repeat upper endoscopy at this time  -Transfuse as needed to a goal hemoglobin 7-8  - PPI BID  -anti-reflux precautions  -Hematology evaluation    Aylin Albarado MD  Gastroenterology  [facility], [place], operated by [facility]  [address] [place] [id number] [phone]     01-Aug-2021 17:59

## 2021-08-01 NOTE — ED PROVIDER NOTE - CLINICAL SUMMARY MEDICAL DECISION MAKING FREE TEXT BOX
pt with likely lumbosacral strain as cause of lower back pain secondary to manipulation of back possibly but otherwise no signs of trauma or issues on xray spine. Will dc with robaxin/tramadol.

## 2021-08-01 NOTE — ED PROVIDER NOTE - OBJECTIVE STATEMENT
Pt is a 52 y/o female with PMH of DM, polycystic ovaries, HLD, HTN, and hypothyroidism presenting to the ED for evaluation of left lower back pain. Pt reports onset of left sided chest pain in 04/2021 prompting her to see PCP who diagnosed her with costochondritis. Pt noted to feel pain with movement with radiation to back and saw orthopedist. Pt then reports seeing new orthopedist who performed manipulation. She states that since then she has constant throbbing ache 2-3/10 in severity and occasional sharp pain 6/10. Pt notes she has been taking Tramadol recently but has not taken today. Pt also reports taking levothyroxine, Aspirin, Statin, and Amlodipine. Pt reports she is followed by cardiologist who performed EKG, echo, and stress test which all reportedly returned normal.

## 2021-08-01 NOTE — ED ADULT TRIAGE NOTE - CHIEF COMPLAINT QUOTE
Chest Pain left sided since April, seen for costochondritis and FU with ortho for ribs, painful on movement, left sided CP radiating to back, increase pain since manipulation by Doctor

## 2021-08-01 NOTE — ED ADULT NURSE NOTE - IN THE PAST 12 MONTHS HAVE YOU USED DRUGS OTHER THAN THOSE REQUIRED FOR MEDICAL REASON?
Letter of Determination: Upgrade from Observation to Inpatient Status    This patient was originally hospitalized as Outpatient Status with Observation Services on 5/14/2018 for intractable pain. This patient now meets for Inpatient Admission based on medical necessity. The patient's stay was medically necessary based on multiple neoplastic disease with prostate, colon, and thyroid cancer and multiple myeloma, requiring 6 doses of intravenous narcotic medication with morphine at 2 mg and 10 mg doses, and hydromorphone at 1 mg within the first 24 hours. It is our recommendation that this patient's hospitalization status should be upgraded from OBSERVATION to INPATIENT status.      The final decision regarding the patient's hospitalization status depends on the attending physician's judgement.     Constance Xiao MD, [facility],   Physician [facility].
No

## 2021-08-01 NOTE — ED PROVIDER NOTE - PATIENT PORTAL LINK FT
You can access the FollowMyHealth Patient Portal offered by [facility] by registering at the following website: [email or web address]. By joining [employer]’s FollowMyHealth portal, you will also be able to view your health information using other applications (apps) compatible with our system.

## 2021-08-03 LAB
CULTURE RESULTS: SIGNIFICANT CHANGE UP
SPECIMEN SOURCE: SIGNIFICANT CHANGE UP

## 2021-08-10 ENCOUNTER — APPOINTMENT (OUTPATIENT)
Dept: FAMILY MEDICINE | Facility: CLINIC | Age: 51
End: 2021-08-10
Payer: COMMERCIAL

## 2021-08-10 VITALS
DIASTOLIC BLOOD PRESSURE: 75 MMHG | HEIGHT: 59 IN | SYSTOLIC BLOOD PRESSURE: 128 MMHG | WEIGHT: 195 LBS | HEART RATE: 97 BPM | TEMPERATURE: 96.9 F | BODY MASS INDEX: 39.31 KG/M2 | OXYGEN SATURATION: 97 %

## 2021-08-10 DIAGNOSIS — M54.5 LOW BACK PAIN: ICD-10-CM

## 2021-08-10 PROCEDURE — 99214 OFFICE O/P EST MOD 30 MIN: CPT

## 2021-08-10 NOTE — ASSESSMENT
[FreeTextEntry1] : left sided rib pain\par most likely costochondritis\par rib series\par nsaids\par reviewed note from PM&R\par \par back pain\par Patient was advised to rest and to use moist heat throughout the day and stretch as appropriate. Patient was advised to take all medications as prescribed. Patient was advised if symptoms persist or worsen return to office.\par was acute on 8/1/21, feeling 75% better with muscle relaxer\par \par CAD\par seeing cardiology, getting calcium score this week\par echocardiogram next week\par seeing cardiology regularly\par \par elevated CBC\par going to hematology july \par \par hypertension\par The patient has a diagnosis of hypertension. Blood work was drawn in office and will be followed.  The diagnosis was discussed with patient and need for medication compliance and possible side affects and risks of noncompliance. Patient was told to adhere to a low salt diet and try to incorporate exercise daily.\par still elevated, increase to 5 and reassess\par \par diabetes\par The diagnosis of diabetes is established with this patient. Previous blood work was reviewed prior to the visit and with the patient at time of visit.  Blood work values were explained as well.  Blood work was drawn in office and results will be reviewed and followed. The patient was counseled on using a low sugar and carbohydrate diet.  Patient was advised to eat small meals high in protein and to exercise regularly. Patient as advised to take all medications as prescribed, compliance with medications was reinforced.  Patient should follow up with yearly podiatry and opthalmology visits. Patient was advised to call office  or go to the ER immediately if experiences any nausea, lightheadedness or for any other issues.\par was diabetic, weaned off all her meds, then just restarted with endo\par a1c is 9.2\par does not do well with metformin\par \par cholesterol\par The diagnosis of high cholesterol is established and known to the patient and provider.  Previous blood work was evaluated at time of visit and reviewed with the patient. Blood work was drawn in office and results will be reviewed and followed. The patient was counseled on a low cholesterol diet and on medication compliance. Patient was advised to generally limit foods fried in oil, high in saturated fat, cheese, eggs and red meat. Patient was advised to continue on current medications and to followup in office for necessary blood work in three months.\par reviewed bw with patient - on crestor 5\par \par thyroid\par Patient had a diagnosis of thyroid disorder. Blood was drawn to assess levels of TSH and T4. Patient will continue on current dose of medications at this time unless instructed otherwise. Patient was advised to take thyroid medications on a empty stomach and to wait at least 45 minutes before eating for appropriate absorption.\par was not on medications, now back on synthroid

## 2021-08-10 NOTE — HISTORY OF PRESENT ILLNESS
[de-identified] : 51 year old female here with complaints of left sided rib pain for a few months, intermittent, no rhyme or reason.  \par \par then went to see PM&R, and then had severe back pain the next day and went to the ER on 8/1/21\par had diagnosis of lower back sprain\par \par Patients active medications, allergies and issues were all reviewed with the patient at time of visit.\par \par

## 2021-08-10 NOTE — HEALTH RISK ASSESSMENT
[] : No [Yes] : Yes [No falls in past year] : Patient reported no falls in the past year [0] : 2) Feeling down, depressed, or hopeless: Not at all (0) [YearQuit] : 2004 [de-identified] : rarely [[id number]] : 0 [Very Good] : ~his/her~  mood as very good [With Significant Other] : lives with significant other [Employed] : employed [] :  [# Of Children ___] : has [unfilled] children [Fully functional (bathing, dressing, toileting, transferring, walking, feeding)] : Fully functional (bathing, dressing, toileting, transferring, walking, feeding) [Fully functional (using the telephone, shopping, preparing meals, housekeeping, doing laundry, using] : Fully functional and needs no help or supervision to perform IADLs (using the telephone, shopping, preparing meals, housekeeping, doing laundry, using transportation, managing medications and managing finances) [de-identified] : [facility] lab

## 2021-08-10 NOTE — PHYSICAL EXAM
[Well Nourished] : well nourished [Well Developed] : well developed [Clear to Auscultation] : lungs were clear to auscultation bilaterally [Regular Rhythm] : with a regular rhythm [Normal S1, S2] : normal S1 and S2 [Normal Gait] : normal gait [Normal Affect] : the affect was normal [Normal Insight/Judgement] : insight and judgment were intact [de-identified] : tenderness to left lower ribs

## 2021-08-11 LAB
25(OH)D3 SERPL-MCNC: 25.1 NG/ML
25(OH)D3 SERPL-MCNC: 34 NG/ML
FRUCTOSAMINE SERPL-MCNC: 415 UMOL/L
GLUCOSE BLDC GLUCOMTR-MCNC: 136
GLUCOSE BLDC GLUCOMTR-MCNC: 194
GLYCOMARK.: 1.4 UG/ML
HBA1C MFR BLD HPLC: 12.3
THYROGLOB AB SERPL-ACNC: <20 IU/ML
THYROPEROXIDASE AB SERPL IA-ACNC: <10 IU/ML
VIT B12 SERPL-MCNC: 743 PG/ML

## 2021-08-13 ENCOUNTER — OUTPATIENT (OUTPATIENT)
Dept: OUTPATIENT SERVICES | Facility: HOSPITAL | Age: 51
LOS: 1 days | End: 2021-08-13
Payer: COMMERCIAL

## 2021-08-13 ENCOUNTER — APPOINTMENT (OUTPATIENT)
Dept: MRI IMAGING | Facility: CLINIC | Age: 51
End: 2021-08-13
Payer: COMMERCIAL

## 2021-08-13 DIAGNOSIS — Z00.8 ENCOUNTER FOR OTHER GENERAL EXAMINATION: ICD-10-CM

## 2021-08-13 PROCEDURE — 71550 MRI CHEST W/O DYE: CPT | Mod: 26

## 2021-08-13 PROCEDURE — 71550 MRI CHEST W/O DYE: CPT

## 2021-08-23 ENCOUNTER — APPOINTMENT (OUTPATIENT)
Dept: ORTHOPEDIC SURGERY | Facility: CLINIC | Age: 51
End: 2021-08-23
Payer: COMMERCIAL

## 2021-08-23 PROCEDURE — 99442: CPT

## 2021-09-08 ENCOUNTER — APPOINTMENT (OUTPATIENT)
Dept: ENDOCRINOLOGY | Facility: CLINIC | Age: 51
End: 2021-09-08
Payer: COMMERCIAL

## 2021-09-08 PROCEDURE — 95251 CONT GLUC MNTR ANALYSIS I&R: CPT

## 2021-09-09 ENCOUNTER — APPOINTMENT (OUTPATIENT)
Dept: INTERNAL MEDICINE | Facility: CLINIC | Age: 51
End: 2021-09-09
Payer: COMMERCIAL

## 2021-09-09 VITALS
HEIGHT: 59 IN | BODY MASS INDEX: 39.31 KG/M2 | WEIGHT: 195 LBS | OXYGEN SATURATION: 98 % | SYSTOLIC BLOOD PRESSURE: 122 MMHG | TEMPERATURE: 98 F | HEART RATE: 94 BPM | DIASTOLIC BLOOD PRESSURE: 76 MMHG

## 2021-09-09 DIAGNOSIS — K80.20 CALCULUS OF GALLBLADDER W/OUT CHOLECYSTITIS W/OUT OBSTRUCTION: ICD-10-CM

## 2021-09-09 PROCEDURE — 99204 OFFICE O/P NEW MOD 45 MIN: CPT

## 2021-09-09 NOTE — HISTORY OF PRESENT ILLNESS
[FreeTextEntry1] : Began having discomfort left side of abdomen chest. felt was costochondritis. Went orthopedist - made matters worse. Went for MRI which found gallstones. \par  she has PCOS She has no symptoms, regarding gallstones .\par She never had a colonoscopy. She has been doing intermittent fasting for last 2 years . She had been on BCP in past.  No children. \par She also needds to have a screening colonoscopy.

## 2021-09-09 NOTE — PHYSICAL EXAM
[General Appearance - Alert] : alert [General Appearance - In No Acute Distress] : in no acute distress [Sclera] : the sclera and conjunctiva were normal [PERRL With Normal Accommodation] : pupils were equal in size, round, and reactive to light [Extraocular Movements] : extraocular movements were intact [Outer Ear] : the ears and nose were normal in appearance [Oropharynx] : the oropharynx was normal [Neck Appearance] : the appearance of the neck was normal [Neck Cervical Mass (___cm)] : no neck mass was observed [Jugular Venous Distention Increased] : there was no jugular-venous distention [Thyroid Diffuse Enlargement] : the thyroid was not enlarged [Thyroid Nodule] : there were no palpable thyroid nodules [Auscultation Breath Sounds / Voice Sounds] : lungs were clear to auscultation bilaterally [Heart Rate And Rhythm] : heart rate was normal and rhythm regular [Heart Sounds] : normal S1 and S2 [Heart Sounds Gallop] : no gallops [Murmurs] : no murmurs [Heart Sounds Pericardial Friction Rub] : no pericardial rub [Full Pulse] : the pedal pulses are present [Edema] : there was no peripheral edema [Bowel Sounds] : normal bowel sounds [Abdomen Soft] : soft [Abdomen Mass (___ Cm)] : no abdominal mass palpated [External Female Genitalia] : normal external genitalia [Urethral Meatus] : normal urethra [Urinary Bladder Findings] : the bladder was normal on palpation [No CVA Tenderness] : no ~M costovertebral angle tenderness [No Spinal Tenderness] : no spinal tenderness [Nail Clubbing] : no clubbing  or cyanosis of the fingernails [Abnormal Walk] : normal gait [Musculoskeletal - Swelling] : no joint swelling seen [Motor Tone] : muscle strength and tone were normal [Skin Color & Pigmentation] : normal skin color and pigmentation [Skin Turgor] : normal skin turgor [] : no rash [Deep Tendon Reflexes (DTR)] : deep tendon reflexes were 2+ and symmetric [Sensation] : the sensory exam was normal to light touch and pinprick [No Focal Deficits] : no focal deficits [Oriented To Time, Place, And Person] : oriented to person, place, and time [Impaired Insight] : insight and judgment were intact [Affect] : the affect was normal [FreeTextEntry1] : RUQ tenderness, mild [place] sign

## 2021-09-17 ENCOUNTER — OUTPATIENT (OUTPATIENT)
Dept: OUTPATIENT SERVICES | Facility: HOSPITAL | Age: 51
LOS: 1 days | Discharge: HOME | End: 2021-09-17

## 2021-09-17 DIAGNOSIS — R79.89 OTHER SPECIFIED ABNORMAL FINDINGS OF BLOOD CHEMISTRY: ICD-10-CM

## 2021-09-20 ENCOUNTER — RESULT REVIEW (OUTPATIENT)
Age: 51
End: 2021-09-20

## 2021-09-20 ENCOUNTER — APPOINTMENT (OUTPATIENT)
Dept: HEMATOLOGY ONCOLOGY | Facility: CLINIC | Age: 51
End: 2021-09-20
Payer: COMMERCIAL

## 2021-09-20 VITALS
WEIGHT: 198.86 LBS | HEART RATE: 84 BPM | RESPIRATION RATE: 18 BRPM | DIASTOLIC BLOOD PRESSURE: 77 MMHG | BODY MASS INDEX: 40.09 KG/M2 | TEMPERATURE: 97.8 F | OXYGEN SATURATION: 99 % | HEIGHT: 59 IN | SYSTOLIC BLOOD PRESSURE: 119 MMHG

## 2021-09-20 LAB
BASOPHILS # BLD AUTO: 0.09 K/UL — SIGNIFICANT CHANGE UP (ref 0–0.2)
BASOPHILS NFR BLD AUTO: 0.6 % — SIGNIFICANT CHANGE UP (ref 0–2)
EOSINOPHIL # BLD AUTO: 0.26 K/UL — SIGNIFICANT CHANGE UP (ref 0–0.5)
EOSINOPHIL NFR BLD AUTO: 1.6 % — SIGNIFICANT CHANGE UP (ref 0–6)
HCT VFR BLD CALC: 43.5 % — SIGNIFICANT CHANGE UP (ref 34.5–45)
HGB BLD-MCNC: 14.7 G/DL — SIGNIFICANT CHANGE UP (ref 11.5–15.5)
IMM GRANULOCYTES NFR BLD AUTO: 0.9 % — SIGNIFICANT CHANGE UP (ref 0–1.5)
LYMPHOCYTES # BLD AUTO: 22.3 % — SIGNIFICANT CHANGE UP (ref 13–44)
LYMPHOCYTES # BLD AUTO: 3.6 K/UL — HIGH (ref 1–3.3)
MCHC RBC-ENTMCNC: 27.1 PG — SIGNIFICANT CHANGE UP (ref 27–34)
MCHC RBC-ENTMCNC: 33.8 G/DL — SIGNIFICANT CHANGE UP (ref 32–36)
MCV RBC AUTO: 80.3 FL — SIGNIFICANT CHANGE UP (ref 80–100)
MONOCYTES # BLD AUTO: 0.84 K/UL — SIGNIFICANT CHANGE UP (ref 0–0.9)
MONOCYTES NFR BLD AUTO: 5.2 % — SIGNIFICANT CHANGE UP (ref 2–14)
NEUTROPHILS # BLD AUTO: 11.2 K/UL — HIGH (ref 1.8–7.4)
NEUTROPHILS NFR BLD AUTO: 69.4 % — SIGNIFICANT CHANGE UP (ref 43–77)
NRBC # BLD: 0 /100 WBCS — SIGNIFICANT CHANGE UP (ref 0–0)
PLATELET # BLD AUTO: 344 K/UL — SIGNIFICANT CHANGE UP (ref 150–400)
RBC # BLD: 5.42 M/UL — HIGH (ref 3.8–5.2)
RBC # FLD: 12.3 % — SIGNIFICANT CHANGE UP (ref 10.3–14.5)
WBC # BLD: 16.13 K/UL — HIGH (ref 3.8–10.5)
WBC # FLD AUTO: 16.13 K/UL — HIGH (ref 3.8–10.5)

## 2021-09-20 PROCEDURE — 99213 OFFICE O/P EST LOW 20 MIN: CPT

## 2021-09-21 NOTE — HISTORY OF PRESENT ILLNESS
[de-identified] : CHART REVIEW: 50 yo F with a PMH DM2 (not on insulin), PCOS, HTN, HLD, psoriasis, and hypothyroidism who presents with leukocytosis and elevated Hb. She states she has had chronic leukocytosis, usually around 15, since the 1990s. She has more recently noticed elevated Hb. She denies headache or blurry vision, recent significant weight changes, itching, joint pains, fevers, or night sweats. She also has no personal or family history of blood clots or abnormal bleeding. Her hypothyroidism is controlled (normal TSH) and her diabetes is improving. [de-identified] : 9/20/2021 \par Patient seen in follow up for pancytosis. she was last seen in 07/2021 when her H/H and platelets were WNL, WBC was boarder line high. She edorses no change in her health history.

## 2021-09-21 NOTE — RESULTS/DATA
[FreeTextEntry1] : 9/20/2021\par Last labs on 7/7/21\par Normla range Hgb and Platelets, mild high WBC. \par \par \par \par 7/7/2021\par Available labs reviewed. \par Pancytosis  on 5/25/21 labs. \par Leukocytosis only in 6/21/2021 labs.

## 2021-09-21 NOTE — PHYSICAL EXAM
[Fully active, able to carry on all pre-disease performance without restriction] : Status 0 - Fully active, able to carry on all pre-disease performance without restriction [Obese] : obese [Normal] : affect appropriate [Ulcers] : no ulcers [Mucositis] : no mucositis [Thrush] : no thrush [de-identified] : Dry mucous membranes

## 2021-09-21 NOTE — ASSESSMENT
[FreeTextEntry1] : 50 yo F with a PMH DM2 (not on insulin), PCOS, HTN, HLD, psoriasis, and hypothyroidism who is seen in consult with leukocytosis and elevated H/H.\par \par # Leukocytosis\par - Chronic, about 15-16K, likely due to PCOS\par - Explained to patient, no further separate management\par - Weight control\par \par # Polycythemia, resolved\par - Follow up H/H WNL\par - Current unknown etiology, r/o malignant causes\par - Will perform iron studies, ferritin, check for PV, hemochromatosis mutation \par \par # Thrombocytosis \par - Resolved\par \par - RTC in 6-12 months \par

## 2021-09-23 DIAGNOSIS — Z12.12 ENCOUNTER FOR SCREENING FOR MALIGNANT NEOPLASM OF COLON: ICD-10-CM

## 2021-09-23 DIAGNOSIS — Z12.11 ENCOUNTER FOR SCREENING FOR MALIGNANT NEOPLASM OF COLON: ICD-10-CM

## 2021-09-23 DIAGNOSIS — Z01.818 ENCOUNTER FOR OTHER PREPROCEDURAL EXAMINATION: ICD-10-CM

## 2021-09-28 ENCOUNTER — NON-APPOINTMENT (OUTPATIENT)
Age: 51
End: 2021-09-28

## 2021-09-28 ENCOUNTER — APPOINTMENT (OUTPATIENT)
Dept: CARDIOLOGY | Facility: CLINIC | Age: 51
End: 2021-09-28
Payer: COMMERCIAL

## 2021-09-28 VITALS
WEIGHT: 198 LBS | OXYGEN SATURATION: 99 % | HEIGHT: 59 IN | TEMPERATURE: 97.6 F | DIASTOLIC BLOOD PRESSURE: 80 MMHG | BODY MASS INDEX: 39.92 KG/M2 | HEART RATE: 90 BPM | SYSTOLIC BLOOD PRESSURE: 122 MMHG

## 2021-09-28 DIAGNOSIS — M94.0 CHONDROCOSTAL JUNCTION SYNDROME [TIETZE]: ICD-10-CM

## 2021-09-28 PROCEDURE — 93000 ELECTROCARDIOGRAM COMPLETE: CPT

## 2021-09-28 PROCEDURE — 99214 OFFICE O/P EST MOD 30 MIN: CPT

## 2021-09-28 NOTE — HISTORY OF PRESENT ILLNESS
[FreeTextEntry1] : The patient presents for evaluation of high blood pressure. Patient is currently tolerating the current  antihypertensive regime and they deny headaches, stiff neck, visual changes, pedal Edema or PND. They also are here for follow-up of elevated cholesterol. Patient is currently tolerating medication and and does not complain of new  muscle pain, joint pain, back pain,urinary changes ,nausea, vomiting, abdominal pain or diarrhea. The patient is trying to follow a low cholesterol diet. \par Amlodipine was increased to 5 mg by pt's PCP.\par -pt states BPs have been well controlled around 120/70 when she takes her BP at home. \par The patient also presents for continued care of diabetes mellitus. Patient is following the diabetic regimen. Patient is tolerating hypoglycemic agents and following the diet. Patient denies any visual changes, blood in the urine, abdominal pain, nausea, vomiting, myalgias, arthralgias, paresthesia’s and syncope. The patient denies any chest discomfort, shortness of breath or new neurologic signs or symptoms. Patient denies any polyuria, worsening nocturia, or polydipsia. \par Pt had echo today\par Pt w/ hx of elevated WBC-has appointment w/ heme on 7/7.\par The patient is here for follow-up of hypothyroidism. The patient states that they are taking their medicine regularly that they have no new complaints. They deny any  heat or cold intolerance hair loss and fatigue. \par Elevated normetanephrine at 237  at last visit-has not seen nephrologist.\par Some headaches, pt believes this is related to allergies.\par pt had Covd 19 in january.\par pt states today that they had one  covid 19 vaccine . pt had the Pfzier  Vaccine without   side effects.  pt denies any recent sore throat, fever, chills  loss of taste or smell.\par pt has ? gallstones  being worked up by Dr Mota.\par \par \par \par

## 2021-09-29 ENCOUNTER — OUTPATIENT (OUTPATIENT)
Dept: OUTPATIENT SERVICES | Facility: HOSPITAL | Age: 51
LOS: 1 days | End: 2021-09-29
Payer: COMMERCIAL

## 2021-09-29 ENCOUNTER — APPOINTMENT (OUTPATIENT)
Dept: ULTRASOUND IMAGING | Facility: IMAGING CENTER | Age: 51
End: 2021-09-29
Payer: COMMERCIAL

## 2021-09-29 DIAGNOSIS — K80.20 CALCULUS OF GALLBLADDER WITHOUT CHOLECYSTITIS WITHOUT OBSTRUCTION: ICD-10-CM

## 2021-09-29 PROCEDURE — 76700 US EXAM ABDOM COMPLETE: CPT

## 2021-09-29 PROCEDURE — 76700 US EXAM ABDOM COMPLETE: CPT | Mod: 26

## 2021-10-02 LAB
ALBUMIN SERPL ELPH-MCNC: 4.4 G/DL
ALP BLD-CCNC: 83 U/L
ALT SERPL-CCNC: 10 U/L
ANION GAP SERPL CALC-SCNC: 12 MMOL/L
AST SERPL-CCNC: 17 U/L
BILIRUB DIRECT SERPL-MCNC: 0.2 MG/DL
BILIRUB INDIRECT SERPL-MCNC: 0.3 MG/DL
BILIRUB SERPL-MCNC: 0.5 MG/DL
BUN SERPL-MCNC: 14 MG/DL
CALCIUM SERPL-MCNC: 10 MG/DL
CHLORIDE SERPL-SCNC: 106 MMOL/L
CHOLEST SERPL-MCNC: 133 MG/DL
CK SERPL-CCNC: 37 U/L
CO2 SERPL-SCNC: 23 MMOL/L
CREAT SERPL-MCNC: 0.5 MG/DL
ESTIMATED AVERAGE GLUCOSE: 186 MG/DL
GLUCOSE SERPL-MCNC: 123 MG/DL
HBA1C MFR BLD HPLC: 8.1 %
HDLC SERPL-MCNC: 49 MG/DL
LDLC SERPL CALC-MCNC: 77 MG/DL
LDLC SERPL DIRECT ASSAY-MCNC: 65 MG/DL
NONHDLC SERPL-MCNC: 84 MG/DL
POTASSIUM SERPL-SCNC: 4.6 MMOL/L
PROT SERPL-MCNC: 7 G/DL
SODIUM SERPL-SCNC: 141 MMOL/L
TRIGL SERPL-MCNC: 39 MG/DL

## 2021-11-01 ENCOUNTER — APPOINTMENT (OUTPATIENT)
Dept: CARDIOLOGY | Facility: CLINIC | Age: 51
End: 2021-11-01
Payer: COMMERCIAL

## 2021-11-01 DIAGNOSIS — R94.31 ABNORMAL ELECTROCARDIOGRAM [ECG] [EKG]: ICD-10-CM

## 2021-11-01 PROCEDURE — 93015 CV STRESS TEST SUPVJ I&R: CPT

## 2021-11-08 ENCOUNTER — APPOINTMENT (OUTPATIENT)
Dept: CARDIOLOGY | Facility: CLINIC | Age: 51
End: 2021-11-08
Payer: COMMERCIAL

## 2021-11-08 PROCEDURE — 93015 CV STRESS TEST SUPVJ I&R: CPT

## 2021-11-08 PROCEDURE — 78452 HT MUSCLE IMAGE SPECT MULT: CPT

## 2021-11-08 PROCEDURE — A9500: CPT

## 2021-11-09 ENCOUNTER — NON-APPOINTMENT (OUTPATIENT)
Age: 51
End: 2021-11-09

## 2021-11-28 ENCOUNTER — NON-APPOINTMENT (OUTPATIENT)
Age: 51
End: 2021-11-28

## 2021-11-29 ENCOUNTER — APPOINTMENT (OUTPATIENT)
Dept: INTERNAL MEDICINE | Facility: AMBULATORY MEDICAL SERVICES | Age: 51
End: 2021-11-29
Payer: COMMERCIAL

## 2021-11-29 LAB — SARS-COV-2 N GENE NPH QL NAA+PROBE: NOT DETECTED

## 2021-11-29 PROCEDURE — 45378 DIAGNOSTIC COLONOSCOPY: CPT

## 2022-01-25 ENCOUNTER — APPOINTMENT (OUTPATIENT)
Dept: CARDIOLOGY | Facility: CLINIC | Age: 52
End: 2022-01-25
Payer: COMMERCIAL

## 2022-01-25 VITALS
DIASTOLIC BLOOD PRESSURE: 85 MMHG | BODY MASS INDEX: 39.92 KG/M2 | OXYGEN SATURATION: 100 % | SYSTOLIC BLOOD PRESSURE: 120 MMHG | HEIGHT: 59 IN | HEART RATE: 98 BPM | WEIGHT: 198 LBS

## 2022-01-25 DIAGNOSIS — U07.1 COVID-19: ICD-10-CM

## 2022-01-25 DIAGNOSIS — R07.89 OTHER CHEST PAIN: ICD-10-CM

## 2022-01-25 PROCEDURE — 99214 OFFICE O/P EST MOD 30 MIN: CPT

## 2022-01-25 RX ORDER — MELOXICAM 15 MG/1
15 TABLET ORAL
Qty: 30 | Refills: 0 | Status: DISCONTINUED | COMMUNITY
Start: 2021-08-10 | End: 2022-01-25

## 2022-01-25 RX ORDER — GABAPENTIN 100 MG/1
100 CAPSULE ORAL 3 TIMES DAILY
Qty: 60 | Refills: 0 | Status: DISCONTINUED | COMMUNITY
Start: 2021-08-26 | End: 2022-01-25

## 2022-01-25 RX ORDER — PREDNISONE 50 MG/1
50 TABLET ORAL DAILY
Qty: 5 | Refills: 0 | Status: DISCONTINUED | COMMUNITY
Start: 2021-07-19 | End: 2022-01-25

## 2022-01-25 RX ORDER — METHOCARBAMOL 750 MG/1
750 TABLET, FILM COATED ORAL
Qty: 42 | Refills: 0 | Status: DISCONTINUED | COMMUNITY
Start: 2021-08-10 | End: 2022-01-25

## 2022-01-25 RX ORDER — SODIUM PICOSULFATE, MAGNESIUM OXIDE, AND ANHYDROUS CITRIC ACID 10; 3.5; 12 MG/160ML; G/160ML; G/160ML
10-3.5-12 MG-GM LIQUID ORAL
Qty: 1 | Refills: 0 | Status: DISCONTINUED | COMMUNITY
Start: 2021-09-23 | End: 2022-01-25

## 2022-01-25 RX ORDER — ERGOCALCIFEROL 1.25 MG/1
1.25 MG CAPSULE, LIQUID FILLED ORAL
Qty: 12 | Refills: 0 | Status: DISCONTINUED | COMMUNITY
Start: 2021-07-16 | End: 2022-01-25

## 2022-01-25 RX ORDER — TRAMADOL HYDROCHLORIDE 50 MG/1
50 TABLET, COATED ORAL
Qty: 20 | Refills: 0 | Status: DISCONTINUED | COMMUNITY
Start: 2021-07-27 | End: 2022-01-25

## 2022-01-26 LAB
25(OH)D3 SERPL-MCNC: 23 NG/ML
ALBUMIN SERPL ELPH-MCNC: 4.5 G/DL
ALP BLD-CCNC: 91 U/L
ALT SERPL-CCNC: 17 U/L
ANION GAP SERPL CALC-SCNC: 16 MMOL/L
AST SERPL-CCNC: 20 U/L
BASOPHILS # BLD AUTO: 0.07 K/UL
BASOPHILS NFR BLD AUTO: 0.5 %
BILIRUB DIRECT SERPL-MCNC: 0.1 MG/DL
BILIRUB INDIRECT SERPL-MCNC: 0.3 MG/DL
BILIRUB SERPL-MCNC: 0.4 MG/DL
BUN SERPL-MCNC: 13 MG/DL
CALCIUM SERPL-MCNC: 10.1 MG/DL
CHLORIDE SERPL-SCNC: 102 MMOL/L
CHOLEST SERPL-MCNC: 143 MG/DL
CK SERPL-CCNC: 32 U/L
CO2 SERPL-SCNC: 21 MMOL/L
COVID-19 NUCLEOCAPSID  GAM ANTIBODY INTERPRETATION: POSITIVE
COVID-19 SPIKE DOMAIN ANTIBODY INTERPRETATION: POSITIVE
CREAT SERPL-MCNC: 0.46 MG/DL
EOSINOPHIL # BLD AUTO: 0.15 K/UL
EOSINOPHIL NFR BLD AUTO: 1.1 %
ESTIMATED AVERAGE GLUCOSE: 220 MG/DL
GLUCOSE SERPL-MCNC: 189 MG/DL
HBA1C MFR BLD HPLC: 9.3 %
HCT VFR BLD CALC: 50.1 %
HDLC SERPL-MCNC: 57 MG/DL
HGB BLD-MCNC: 16.1 G/DL
IMM GRANULOCYTES NFR BLD AUTO: 0.5 %
LDLC SERPL CALC-MCNC: 75 MG/DL
LDLC SERPL DIRECT ASSAY-MCNC: 66 MG/DL
LYMPHOCYTES # BLD AUTO: 2.29 K/UL
LYMPHOCYTES NFR BLD AUTO: 16.7 %
MAGNESIUM SERPL-MCNC: 2.2 MG/DL
MAN DIFF?: NORMAL
MCHC RBC-ENTMCNC: 27.5 PG
MCHC RBC-ENTMCNC: 32.1 GM/DL
MCV RBC AUTO: 85.6 FL
MONOCYTES # BLD AUTO: 0.49 K/UL
MONOCYTES NFR BLD AUTO: 3.6 %
NEUTROPHILS # BLD AUTO: 10.63 K/UL
NEUTROPHILS NFR BLD AUTO: 77.6 %
NONHDLC SERPL-MCNC: 85 MG/DL
OSMOLALITY SERPL: 299 MOSMOL/KG
PHOSPHATE SERPL-MCNC: 4 MG/DL
PLATELET # BLD AUTO: 351 K/UL
POTASSIUM SERPL-SCNC: 4.2 MMOL/L
PROT SERPL-MCNC: 7.3 G/DL
RBC # BLD: 5.85 M/UL
RBC # FLD: 12.6 %
SARS-COV-2 AB SERPL IA-ACNC: >250 U/ML
SARS-COV-2 AB SERPL QL IA: 3.81 INDEX
SODIUM SERPL-SCNC: 140 MMOL/L
T4 FREE SERPL-MCNC: 1.6 NG/DL
TRIGL SERPL-MCNC: 53 MG/DL
TSH SERPL-ACNC: 1.98 UIU/ML
WBC # FLD AUTO: 13.7 K/UL

## 2022-01-28 NOTE — HISTORY OF PRESENT ILLNESS
[FreeTextEntry1] : Prisca is a 51 y.o female w/PMHx of CAD, HLD, HTN, DM, Hypothyroid who presents for routine follow up.\par \par The patient is here for follow-up of elevated cholesterol. Currently tolerating prescribed medications. Denies muscle pain, joint pain, back pain, urinary changes, nausea, vomiting, abdominal pain or diarrhea. The patient is trying to follow a low cholesterol diet. \par The patient is here for evaluation of high blood pressure. Currently tolerating antihypertensive medications. Denies headaches, stiff neck, visual changes, or PND. The patient has been trying to stay on a low-sodium diet. \par The patient also presents for continued care of diabetes mellitus. Patient is following recommended diabetic regimen. Tolerating hypoglycemic agents and diet recommendations. Denies visual changes, confusion, palpitations, tremors, diaphoresis, blood in the urine, abdominal pain, nausea, vomiting, myalgias, arthralgias, paresthesia’s and syncope. Too denies any chest discomfort, shortness of breath or new neurologic signs or symptoms. Also denies any polyuria, worsening nocturia, or polydipsia. \par The patient is here for follow-up of hypothyroidism. The patient states that they are taking their medicine regularly and that they have no new complaints. They deny any tremors, excessive weight changes, heat or cold intolerance, hair loss, and fatigue.

## 2022-03-01 ENCOUNTER — OUTPATIENT (OUTPATIENT)
Dept: OUTPATIENT SERVICES | Facility: HOSPITAL | Age: 52
LOS: 1 days | End: 2022-03-01
Payer: COMMERCIAL

## 2022-03-01 ENCOUNTER — APPOINTMENT (OUTPATIENT)
Dept: CT IMAGING | Facility: IMAGING CENTER | Age: 52
End: 2022-03-01
Payer: COMMERCIAL

## 2022-03-01 DIAGNOSIS — R91.1 SOLITARY PULMONARY NODULE: ICD-10-CM

## 2022-03-01 PROCEDURE — 71250 CT THORAX DX C-: CPT | Mod: 26

## 2022-03-01 PROCEDURE — 71250 CT THORAX DX C-: CPT

## 2022-03-07 ENCOUNTER — APPOINTMENT (OUTPATIENT)
Dept: ENDOCRINOLOGY | Facility: CLINIC | Age: 52
End: 2022-03-07
Payer: COMMERCIAL

## 2022-03-07 VITALS
HEIGHT: 59 IN | BODY MASS INDEX: 39.31 KG/M2 | SYSTOLIC BLOOD PRESSURE: 126 MMHG | OXYGEN SATURATION: 98 % | WEIGHT: 195 LBS | TEMPERATURE: 97.9 F | HEART RATE: 88 BPM | DIASTOLIC BLOOD PRESSURE: 82 MMHG

## 2022-03-07 LAB
GLUCOSE BLDC GLUCOMTR-MCNC: 64
HBA1C MFR BLD HPLC: 8

## 2022-03-07 PROCEDURE — 83036 HEMOGLOBIN GLYCOSYLATED A1C: CPT | Mod: QW

## 2022-03-07 PROCEDURE — 36415 COLL VENOUS BLD VENIPUNCTURE: CPT

## 2022-03-07 PROCEDURE — 99214 OFFICE O/P EST MOD 30 MIN: CPT | Mod: 25

## 2022-03-07 PROCEDURE — 82962 GLUCOSE BLOOD TEST: CPT

## 2022-03-07 NOTE — HISTORY OF PRESENT ILLNESS
[FreeTextEntry1] : Ms. MARTINEZ is a 51 year old female who returns ith regard to a history of type 2 Diabetes since 1993. There is no known history of retinopathy, nephropathy. With regard to neuropathy, she does note twitching of left hand.Patient is currently on glimepiride 2 mg daily and Jardiance 10 mg daily-unable to tolerate Synjardy. Patient denies GI upset on glimepiride. In the past, patient was on metformin -however had GI sdie effects and stopped. Patient was on insulin while attempting to conceive. ? did use byetta inj, stopped due increased sugar craving. There has been no significant hypoglycemia. She denies any chest pain, sob, neurologic or ophthalmologic complaints. She too denies any new podiatric concerns. She is not up to date with her ophthalmologic visit. HGM has not been carried out of late. When she was testing she saw 115- 208 in the am, 130s- 140s after dinner.  Patient has followed intermittent fasting- Eating between 1-7 pm. Admits to adequate intake of water. States that her diet is high in carbohydrates.\par Labs from 05/25/21 revealed TSH at 9.34, vitamin D 25-OH at 25.1 Now on LT4 50 mcg and weekly vit d.\par POCT A1c returned today at  8.0% ; previously 9.3% from 1/25/2022\par POCT glucose returned today at  64  mg/dL (was given juice)\par  \par Additional medical history includes that of elevated BP and hypothyroidism. DOes take the LT4 noted above along with Amlodipine -couldn't tolerate Labetalol. \par States she has had a headache behind both eyes for the past week. History of migraines 3x per year- uses Advil.

## 2022-03-08 LAB
25(OH)D3 SERPL-MCNC: 30.4 NG/ML
ALBUMIN SERPL ELPH-MCNC: 4.9 G/DL
ALP BLD-CCNC: 90 U/L
ALT SERPL-CCNC: 15 U/L
ANION GAP SERPL CALC-SCNC: 18 MMOL/L
AST SERPL-CCNC: 17 U/L
BASOPHILS # BLD AUTO: 0.06 K/UL
BASOPHILS NFR BLD AUTO: 0.4 %
BILIRUB SERPL-MCNC: 0.6 MG/DL
BUN SERPL-MCNC: 17 MG/DL
CALCIUM SERPL-MCNC: 10.3 MG/DL
CHLORIDE SERPL-SCNC: 102 MMOL/L
CHOLEST SERPL-MCNC: 139 MG/DL
CO2 SERPL-SCNC: 21 MMOL/L
CREAT SERPL-MCNC: 0.48 MG/DL
CREAT SPEC-SCNC: 84 MG/DL
EGFR: 115 ML/MIN/1.73M2
EOSINOPHIL # BLD AUTO: 0.17 K/UL
EOSINOPHIL NFR BLD AUTO: 1.1 %
ESTIMATED AVERAGE GLUCOSE: 197 MG/DL
FRUCTOSAMINE SERPL-MCNC: 293 UMOL/L
GLUCOSE SERPL-MCNC: 115 MG/DL
GLYCOMARK.: 1.1 UG/ML
HBA1C MFR BLD HPLC: 8.5 %
HCT VFR BLD CALC: 48.1 %
HDLC SERPL-MCNC: 62 MG/DL
HGB BLD-MCNC: 14.9 G/DL
IMM GRANULOCYTES NFR BLD AUTO: 0.3 %
LDLC SERPL DIRECT ASSAY-MCNC: 57 MG/DL
LYMPHOCYTES # BLD AUTO: 3.2 K/UL
LYMPHOCYTES NFR BLD AUTO: 21.5 %
MAN DIFF?: NORMAL
MCHC RBC-ENTMCNC: 27.3 PG
MCHC RBC-ENTMCNC: 31 GM/DL
MCV RBC AUTO: 88.1 FL
MICROALBUMIN 24H UR DL<=1MG/L-MCNC: <1.2 MG/DL
MICROALBUMIN/CREAT 24H UR-RTO: NORMAL MG/G
MONOCYTES # BLD AUTO: 0.76 K/UL
MONOCYTES NFR BLD AUTO: 5.1 %
NEUTROPHILS # BLD AUTO: 10.61 K/UL
NEUTROPHILS NFR BLD AUTO: 71.6 %
PLATELET # BLD AUTO: 355 K/UL
POTASSIUM SERPL-SCNC: 4.7 MMOL/L
PROT SERPL-MCNC: 7.3 G/DL
RBC # BLD: 5.46 M/UL
RBC # FLD: 13.1 %
SODIUM SERPL-SCNC: 141 MMOL/L
T3FREE SERPL-MCNC: 2.52 PG/ML
T4 FREE SERPL-MCNC: 1.6 NG/DL
TRIGL SERPL-MCNC: 53 MG/DL
TSH SERPL-ACNC: 3.53 UIU/ML
WBC # FLD AUTO: 14.85 K/UL

## 2022-03-22 RX ORDER — LANCETS 33 GAUGE
EACH MISCELLANEOUS
Qty: 3 | Refills: 3 | Status: ACTIVE | COMMUNITY
Start: 2021-05-25 | End: 1900-01-01

## 2022-03-22 RX ORDER — BLOOD SUGAR DIAGNOSTIC
STRIP MISCELLANEOUS
Qty: 3 | Refills: 3 | Status: ACTIVE | COMMUNITY
Start: 2022-03-22 | End: 1900-01-01

## 2022-03-26 ENCOUNTER — NON-APPOINTMENT (OUTPATIENT)
Age: 52
End: 2022-03-26

## 2022-05-16 ENCOUNTER — NON-APPOINTMENT (OUTPATIENT)
Age: 52
End: 2022-05-16

## 2022-05-16 ENCOUNTER — APPOINTMENT (OUTPATIENT)
Dept: OPHTHALMOLOGY | Facility: CLINIC | Age: 52
End: 2022-05-16
Payer: COMMERCIAL

## 2022-05-16 PROCEDURE — 92004 COMPRE OPH EXAM NEW PT 1/>: CPT

## 2022-05-23 ENCOUNTER — APPOINTMENT (OUTPATIENT)
Dept: CARDIOLOGY | Facility: CLINIC | Age: 52
End: 2022-05-23
Payer: COMMERCIAL

## 2022-05-23 ENCOUNTER — LABORATORY RESULT (OUTPATIENT)
Age: 52
End: 2022-05-23

## 2022-05-23 ENCOUNTER — NON-APPOINTMENT (OUTPATIENT)
Age: 52
End: 2022-05-23

## 2022-05-23 VITALS
TEMPERATURE: 98 F | HEART RATE: 83 BPM | OXYGEN SATURATION: 99 % | SYSTOLIC BLOOD PRESSURE: 122 MMHG | DIASTOLIC BLOOD PRESSURE: 80 MMHG | HEIGHT: 59 IN

## 2022-05-23 VITALS — BODY MASS INDEX: 38.98 KG/M2 | WEIGHT: 193 LBS

## 2022-05-23 PROCEDURE — 93000 ELECTROCARDIOGRAM COMPLETE: CPT

## 2022-05-23 PROCEDURE — 99214 OFFICE O/P EST MOD 30 MIN: CPT

## 2022-05-24 ENCOUNTER — NON-APPOINTMENT (OUTPATIENT)
Age: 52
End: 2022-05-24

## 2022-05-24 LAB
ALBUMIN SERPL ELPH-MCNC: 4.6 G/DL
ALP BLD-CCNC: 79 U/L
ALT SERPL-CCNC: 15 U/L
ANION GAP SERPL CALC-SCNC: 12 MMOL/L
APO B SERPL-MCNC: 68 MG/DL
AST SERPL-CCNC: 17 U/L
BILIRUB DIRECT SERPL-MCNC: 0.2 MG/DL
BILIRUB INDIRECT SERPL-MCNC: 0.3 MG/DL
BILIRUB SERPL-MCNC: 0.4 MG/DL
BUN SERPL-MCNC: 11 MG/DL
CALCIUM SERPL-MCNC: 9.8 MG/DL
CHLORIDE SERPL-SCNC: 106 MMOL/L
CHOLEST SERPL-MCNC: 126 MG/DL
CK SERPL-CCNC: 46 U/L
CO2 SERPL-SCNC: 24 MMOL/L
COVID-19 NUCLEOCAPSID  GAM ANTIBODY INTERPRETATION: POSITIVE
COVID-19 SPIKE DOMAIN ANTIBODY INTERPRETATION: POSITIVE
CREAT SERPL-MCNC: 0.6 MG/DL
EGFR: 109 ML/MIN/1.73M2
ESTIMATED AVERAGE GLUCOSE: 209 MG/DL
GLUCOSE SERPL-MCNC: 100 MG/DL
HBA1C MFR BLD HPLC: 8.9 %
HDLC SERPL-MCNC: 50 MG/DL
LDLC SERPL CALC-MCNC: 66 MG/DL
LDLC SERPL DIRECT ASSAY-MCNC: 58 MG/DL
NONHDLC SERPL-MCNC: 76 MG/DL
POTASSIUM SERPL-SCNC: 4.7 MMOL/L
PROT SERPL-MCNC: 6.8 G/DL
SARS-COV-2 AB SERPL IA-ACNC: >250 U/ML
SARS-COV-2 AB SERPL QL IA: 3.23 INDEX
SODIUM SERPL-SCNC: 142 MMOL/L
TRIGL SERPL-MCNC: 49 MG/DL

## 2022-05-24 NOTE — HISTORY OF PRESENT ILLNESS
[FreeTextEntry1] : Prisca is a 51 y.o female w/PMHx of CAD, HLD, HTN, DM, Hypothyroid who presents for routine follow up.\par \par The patient is here for follow-up of elevated cholesterol. Currently tolerating prescribed medications. Denies muscle pain, joint pain, back pain, urinary changes, nausea, vomiting, abdominal pain or diarrhea. The patient is trying to follow a low cholesterol diet. \par The patient is here for evaluation of high blood pressure. Currently tolerating antihypertensive medications. Denies headaches, stiff neck, visual changes, or PND. The patient has been trying to stay on a low-sodium diet. \par The patient also presents for continued care of diabetes mellitus. Patient is following recommended diabetic regimen. Tolerating hypoglycemic agents and diet recommendations. Denies visual changes, confusion, palpitations, tremors, diaphoresis, blood in the urine, abdominal pain, nausea, vomiting, myalgias, arthralgias, paresthesia’s and syncope. Too denies any chest discomfort, shortness of breath or new neurologic signs or symptoms. Also denies any polyuria, worsening nocturia, or polydipsia. \par The patient is here for follow-up of hypothyroidism. The patient states that they are taking their medicine regularly and that they have no new complaints. They deny any tremors, excessive weight changes, heat or cold intolerance, hair loss, and fatigue. \par Pt due for retina specialist Dr. Gallagher this week due to diabetic retinopathy.

## 2022-08-15 ENCOUNTER — APPOINTMENT (OUTPATIENT)
Dept: CARDIOLOGY | Facility: CLINIC | Age: 52
End: 2022-08-15

## 2022-08-15 ENCOUNTER — NON-APPOINTMENT (OUTPATIENT)
Age: 52
End: 2022-08-15

## 2022-08-15 VITALS
OXYGEN SATURATION: 98 % | HEART RATE: 91 BPM | HEIGHT: 59 IN | WEIGHT: 195.25 LBS | SYSTOLIC BLOOD PRESSURE: 130 MMHG | DIASTOLIC BLOOD PRESSURE: 70 MMHG | BODY MASS INDEX: 39.36 KG/M2 | TEMPERATURE: 98.9 F

## 2022-08-15 DIAGNOSIS — I51.7 CARDIOMEGALY: ICD-10-CM

## 2022-08-15 PROCEDURE — 99214 OFFICE O/P EST MOD 30 MIN: CPT

## 2022-08-15 PROCEDURE — 93306 TTE W/DOPPLER COMPLETE: CPT

## 2022-08-15 PROCEDURE — 93000 ELECTROCARDIOGRAM COMPLETE: CPT

## 2022-08-15 RX ORDER — REPAGLINIDE 1 MG/1
1 TABLET ORAL
Qty: 90 | Refills: 1 | Status: DISCONTINUED | COMMUNITY
Start: 2022-03-07 | End: 2022-08-15

## 2022-08-15 NOTE — HISTORY OF PRESENT ILLNESS
[FreeTextEntry1] : Prisca is a 51 y.o female w/PMHx of CAD, HLD, HTN, DM, Hypothyroid who establish care as PCP in office today.\par \par The patient is here for follow-up of elevated cholesterol. Currently tolerating prescribed medications. Denies muscle pain, joint pain, back pain, urinary changes, nausea, vomiting, abdominal pain or diarrhea. The patient is trying to follow a low cholesterol diet. \par The patient is here for evaluation of high blood pressure. Currently tolerating antihypertensive medications. Denies headaches, stiff neck, visual changes, or PND. The patient has been trying to stay on a low-sodium diet. \par The patient also presents for continued care of diabetes mellitus. Patient is following recommended diabetic regimen. Tolerating hypoglycemic agents and diet recommendations. Denies visual changes, confusion, palpitations, tremors, diaphoresis, blood in the urine, abdominal pain, nausea, vomiting, myalgias, arthralgias, paresthesia’s and syncope. Too denies any chest discomfort, shortness of breath or new neurologic signs or symptoms. Also denies any polyuria, worsening nocturia, or polydipsia. \par The patient is here for follow-up of hypothyroidism. The patient states that they are taking their medicine regularly and that they have no new complaints. They deny any tremors, excessive weight changes, heat or cold intolerance, hair loss, and fatigue. \par Pt due for retina specialist Dr. Gallagher this week due to diabetic retinopathy.

## 2022-08-16 LAB
ALBUMIN SERPL ELPH-MCNC: 4.5 G/DL
ALP BLD-CCNC: 71 U/L
ALT SERPL-CCNC: 11 U/L
ANION GAP SERPL CALC-SCNC: 13 MMOL/L
APPEARANCE: CLEAR
AST SERPL-CCNC: 15 U/L
BACTERIA: NEGATIVE
BASOPHILS # BLD AUTO: 0.07 K/UL
BASOPHILS NFR BLD AUTO: 0.5 %
BILIRUB DIRECT SERPL-MCNC: 0.2 MG/DL
BILIRUB SERPL-MCNC: 0.6 MG/DL
BILIRUBIN URINE: NEGATIVE
BLOOD URINE: NEGATIVE
BUN SERPL-MCNC: 13 MG/DL
CALCIUM SERPL-MCNC: 9.9 MG/DL
CHLORIDE SERPL-SCNC: 105 MMOL/L
CHOLEST SERPL-MCNC: 131 MG/DL
CK SERPL-CCNC: 30 U/L
CO2 SERPL-SCNC: 22 MMOL/L
COLOR: NORMAL
COVID-19 NUCLEOCAPSID  GAM ANTIBODY INTERPRETATION: POSITIVE
COVID-19 SPIKE DOMAIN ANTIBODY INTERPRETATION: POSITIVE
CREAT SERPL-MCNC: 0.52 MG/DL
EGFR: 112 ML/MIN/1.73M2
EOSINOPHIL # BLD AUTO: 0.28 K/UL
EOSINOPHIL NFR BLD AUTO: 2 %
ESTIMATED AVERAGE GLUCOSE: 166 MG/DL
GLUCOSE QUALITATIVE U: ABNORMAL
GLUCOSE SERPL-MCNC: 84 MG/DL
HBA1C MFR BLD HPLC: 7.4 %
HCT VFR BLD CALC: 44.6 %
HDLC SERPL-MCNC: 57 MG/DL
HGB BLD-MCNC: 14 G/DL
HYALINE CASTS: 3 /LPF
IMM GRANULOCYTES NFR BLD AUTO: 0.4 %
KETONES URINE: ABNORMAL
LDLC SERPL CALC-MCNC: 65 MG/DL
LEUKOCYTE ESTERASE URINE: ABNORMAL
LYMPHOCYTES # BLD AUTO: 3.41 K/UL
LYMPHOCYTES NFR BLD AUTO: 23.9 %
MAGNESIUM SERPL-MCNC: 2 MG/DL
MAN DIFF?: NORMAL
MCHC RBC-ENTMCNC: 25.1 PG
MCHC RBC-ENTMCNC: 31.4 GM/DL
MCV RBC AUTO: 80.1 FL
MICROSCOPIC-UA: NORMAL
MONOCYTES # BLD AUTO: 0.8 K/UL
MONOCYTES NFR BLD AUTO: 5.6 %
NEUTROPHILS # BLD AUTO: 9.66 K/UL
NEUTROPHILS NFR BLD AUTO: 67.6 %
NITRITE URINE: NEGATIVE
NONHDLC SERPL-MCNC: 74 MG/DL
OSMOLALITY SERPL: 287 MOSMOL/KG
PH URINE: 6
PHOSPHATE SERPL-MCNC: 3.6 MG/DL
PLATELET # BLD AUTO: 333 K/UL
POTASSIUM SERPL-SCNC: 4.1 MMOL/L
PROT SERPL-MCNC: 7.4 G/DL
PROTEIN URINE: NEGATIVE
RBC # BLD: 5.57 M/UL
RBC # FLD: 14 %
RED BLOOD CELLS URINE: 1 /HPF
SARS-COV-2 AB SERPL IA-ACNC: >250 U/ML
SARS-COV-2 AB SERPL QL IA: 2.08 INDEX
SODIUM SERPL-SCNC: 140 MMOL/L
SPECIFIC GRAVITY URINE: 1.02
SQUAMOUS EPITHELIAL CELLS: 3 /HPF
T4 FREE SERPL-MCNC: 1.5 NG/DL
TRIGL SERPL-MCNC: 44 MG/DL
TSH SERPL-ACNC: 2.55 UIU/ML
URATE SERPL-MCNC: 3.3 MG/DL
UROBILINOGEN URINE: NORMAL
WBC # FLD AUTO: 14.27 K/UL
WHITE BLOOD CELLS URINE: 8 /HPF

## 2022-10-10 LAB
ALBUMIN SERPL ELPH-MCNC: 4.1 G/DL
ALP BLD-CCNC: 72 U/L
ALT SERPL-CCNC: 15 U/L
ANION GAP SERPL CALC-SCNC: 11 MMOL/L
AST SERPL-CCNC: 15 U/L
BILIRUB DIRECT SERPL-MCNC: 0.1 MG/DL
BILIRUB INDIRECT SERPL-MCNC: 0.1 MG/DL
BILIRUB SERPL-MCNC: 0.2 MG/DL
BUN SERPL-MCNC: 11 MG/DL
CALCIUM SERPL-MCNC: 9.2 MG/DL
CHLORIDE SERPL-SCNC: 104 MMOL/L
CHOLEST SERPL-MCNC: 117 MG/DL
CK SERPL-CCNC: 29 U/L
CO2 SERPL-SCNC: 25 MMOL/L
CREAT SERPL-MCNC: 0.52 MG/DL
EGFR: 112 ML/MIN/1.73M2
ESTIMATED AVERAGE GLUCOSE: 148 MG/DL
GLUCOSE SERPL-MCNC: 139 MG/DL
HBA1C MFR BLD HPLC: 6.8 %
HDLC SERPL-MCNC: 54 MG/DL
LDLC SERPL CALC-MCNC: 53 MG/DL
LDLC SERPL DIRECT ASSAY-MCNC: 45 MG/DL
NONHDLC SERPL-MCNC: 63 MG/DL
POTASSIUM SERPL-SCNC: 4.1 MMOL/L
PROT SERPL-MCNC: 6.4 G/DL
SODIUM SERPL-SCNC: 141 MMOL/L
TRIGL SERPL-MCNC: 50 MG/DL

## 2022-11-09 ENCOUNTER — NON-APPOINTMENT (OUTPATIENT)
Age: 52
End: 2022-11-09

## 2022-11-11 ENCOUNTER — OUTPATIENT (OUTPATIENT)
Dept: OUTPATIENT SERVICES | Facility: HOSPITAL | Age: 52
LOS: 1 days | End: 2022-11-11
Payer: COMMERCIAL

## 2022-11-11 ENCOUNTER — APPOINTMENT (OUTPATIENT)
Dept: CT IMAGING | Facility: IMAGING CENTER | Age: 52
End: 2022-11-11

## 2022-11-11 ENCOUNTER — RESULT REVIEW (OUTPATIENT)
Age: 52
End: 2022-11-11

## 2022-11-11 DIAGNOSIS — Z00.8 ENCOUNTER FOR OTHER GENERAL EXAMINATION: ICD-10-CM

## 2022-11-11 PROCEDURE — 70450 CT HEAD/BRAIN W/O DYE: CPT

## 2022-11-11 PROCEDURE — 70450 CT HEAD/BRAIN W/O DYE: CPT | Mod: 26

## 2022-12-02 ENCOUNTER — NON-APPOINTMENT (OUTPATIENT)
Age: 52
End: 2022-12-02

## 2023-01-14 ENCOUNTER — RX RENEWAL (OUTPATIENT)
Age: 53
End: 2023-01-14

## 2023-02-13 ENCOUNTER — APPOINTMENT (OUTPATIENT)
Dept: ENDOCRINOLOGY | Facility: CLINIC | Age: 53
End: 2023-02-13
Payer: COMMERCIAL

## 2023-02-13 VITALS
SYSTOLIC BLOOD PRESSURE: 120 MMHG | BODY MASS INDEX: 35.08 KG/M2 | DIASTOLIC BLOOD PRESSURE: 60 MMHG | HEART RATE: 65 BPM | HEIGHT: 59 IN | TEMPERATURE: 98.7 F | OXYGEN SATURATION: 99 % | WEIGHT: 174 LBS

## 2023-02-13 LAB
GLUCOSE BLDC GLUCOMTR-MCNC: 66
HBA1C MFR BLD HPLC: 6.6

## 2023-02-13 PROCEDURE — 36415 COLL VENOUS BLD VENIPUNCTURE: CPT

## 2023-02-13 PROCEDURE — 83036 HEMOGLOBIN GLYCOSYLATED A1C: CPT | Mod: QW

## 2023-02-13 PROCEDURE — 99214 OFFICE O/P EST MOD 30 MIN: CPT | Mod: 25

## 2023-02-13 PROCEDURE — 82962 GLUCOSE BLOOD TEST: CPT

## 2023-02-13 RX ORDER — TIRZEPATIDE 7.5 MG/.5ML
7.5 INJECTION, SOLUTION SUBCUTANEOUS
Qty: 1 | Refills: 0 | Status: DISCONTINUED | COMMUNITY
Start: 2022-12-02 | End: 2023-02-13

## 2023-02-13 RX ORDER — BLOOD SUGAR DIAGNOSTIC
STRIP MISCELLANEOUS 3 TIMES DAILY
Qty: 3 | Refills: 3 | Status: DISCONTINUED | COMMUNITY
Start: 2021-07-14 | End: 2023-02-13

## 2023-02-14 ENCOUNTER — NON-APPOINTMENT (OUTPATIENT)
Age: 53
End: 2023-02-14

## 2023-02-15 LAB
25(OH)D3 SERPL-MCNC: 48.8 NG/ML
ALBUMIN SERPL ELPH-MCNC: 4.3 G/DL
ALP BLD-CCNC: 67 U/L
ALT SERPL-CCNC: 22 U/L
ANION GAP SERPL CALC-SCNC: 17 MMOL/L
AST SERPL-CCNC: 28 U/L
BASOPHILS # BLD AUTO: 0.05 K/UL
BASOPHILS NFR BLD AUTO: 0.4 %
BILIRUB SERPL-MCNC: 0.4 MG/DL
BUN SERPL-MCNC: 8 MG/DL
CALCIUM SERPL-MCNC: 10.1 MG/DL
CHLORIDE SERPL-SCNC: 103 MMOL/L
CHOLEST SERPL-MCNC: 131 MG/DL
CO2 SERPL-SCNC: 21 MMOL/L
CREAT SERPL-MCNC: 0.46 MG/DL
CREAT SPEC-SCNC: 73 MG/DL
EGFR: 116 ML/MIN/1.73M2
EOSINOPHIL # BLD AUTO: 0.19 K/UL
EOSINOPHIL NFR BLD AUTO: 1.6 %
ESTIMATED AVERAGE GLUCOSE: 151 MG/DL
FRUCTOSAMINE SERPL-MCNC: 234 UMOL/L
GLUCOSE SERPL-MCNC: 72 MG/DL
GLYCOMARK.: 0.9 UG/ML
HBA1C MFR BLD HPLC: 6.9 %
HCT VFR BLD CALC: 47.9 %
HDLC SERPL-MCNC: 59 MG/DL
HGB BLD-MCNC: 14.7 G/DL
IMM GRANULOCYTES NFR BLD AUTO: 0.3 %
LDLC SERPL CALC-MCNC: 60 MG/DL
LYMPHOCYTES # BLD AUTO: 2.14 K/UL
LYMPHOCYTES NFR BLD AUTO: 18.3 %
MAGNESIUM SERPL-MCNC: 2.1 MG/DL
MAN DIFF?: NORMAL
MCHC RBC-ENTMCNC: 25.7 PG
MCHC RBC-ENTMCNC: 30.7 GM/DL
MCV RBC AUTO: 83.7 FL
MICROALBUMIN 24H UR DL<=1MG/L-MCNC: <1.2 MG/DL
MICROALBUMIN/CREAT 24H UR-RTO: NORMAL MG/G
MONOCYTES # BLD AUTO: 0.75 K/UL
MONOCYTES NFR BLD AUTO: 6.4 %
NEUTROPHILS # BLD AUTO: 8.53 K/UL
NEUTROPHILS NFR BLD AUTO: 73 %
NONHDLC SERPL-MCNC: 72 MG/DL
PLATELET # BLD AUTO: 297 K/UL
POTASSIUM SERPL-SCNC: 4.1 MMOL/L
PROT SERPL-MCNC: 7.3 G/DL
RBC # BLD: 5.72 M/UL
RBC # FLD: 15.5 %
SODIUM SERPL-SCNC: 141 MMOL/L
T3FREE SERPL-MCNC: 2.56 PG/ML
T4 FREE SERPL-MCNC: 1.6 NG/DL
TRIGL SERPL-MCNC: 59 MG/DL
TSH SERPL-ACNC: 1.96 UIU/ML
VIT B12 SERPL-MCNC: 628 PG/ML
WBC # FLD AUTO: 11.7 K/UL

## 2023-02-25 ENCOUNTER — NON-APPOINTMENT (OUTPATIENT)
Age: 53
End: 2023-02-25

## 2023-02-27 NOTE — HISTORY OF PRESENT ILLNESS
[FreeTextEntry1] : Ms. MARTINEZ is a 52 year old female who returns to regard to a history of type 2  Diabetes since 1993. There is no known history of retinopathy, nephropathy. With regard to neuropathy, she does note twitching of left hand.\par \par Patient is currently on glimepiride 2 mg daily and Jardiance 25 mg daily-unable to tolerate Synjardy.\par \par There has been no significant hypoglycemia. Patient reports determining whether or not to take glimepiride based on how low her BG runs-For instance, patient states if BG < 80, she will not take glimepiride. Patient also taking ozempic 1 mg q weekly; has been on this dose for three months. Regarding ozempic, patient denies overt GI sx except for intermittent constipation.\par \par  Today's weight = 174 lbs, previously 195 lbs in August 2022. She denies any chest pain, sob, neurologic or ophthalmologic complaints. She too denies any new podiatric concerns. She is due for opthalmology visit  in May \par \par In the past, patient was on metformin -however had GI side effects and stopped. Patient was on insulin while attempting to conceive. ? did use  byetta inj, stopped due increased sugar craving.\par \par Patient checks BG's at home manually 1-3 times daily. Patient checks BG's mostly in the morning fasting ranging 115-130 . If patient were to check in afternoon, BG's around dinnertime range between \par . Patient denies any episodes of hypoglycemia. \par \par Patient states she does carry out intermittent fasting.\par \par POCT glucose returned today at 66 mg/dL fasting. Patient given juice and crackers. \par POCT A1c returned today at 6.6%, previously 6.8% in October 2022. \par \par now on LT4 50 mcg and daily vitamin D 4000 iu daily\par \par Additional medical history includes that of elevated BP and hypothyroidism. Does take the LT4 noted above along with Amlodipine -couldn't tolerate Labetalol.\par Following with cardiologist Dr. Sung\par

## 2023-03-21 ENCOUNTER — NON-APPOINTMENT (OUTPATIENT)
Age: 53
End: 2023-03-21

## 2023-04-02 ENCOUNTER — OUTPATIENT (OUTPATIENT)
Dept: OUTPATIENT SERVICES | Facility: HOSPITAL | Age: 53
LOS: 1 days | Discharge: HOME | End: 2023-04-02

## 2023-04-02 DIAGNOSIS — R79.89 OTHER SPECIFIED ABNORMAL FINDINGS OF BLOOD CHEMISTRY: ICD-10-CM

## 2023-04-05 ENCOUNTER — APPOINTMENT (OUTPATIENT)
Dept: HEMATOLOGY ONCOLOGY | Facility: CLINIC | Age: 53
End: 2023-04-05
Payer: COMMERCIAL

## 2023-04-05 VITALS
HEART RATE: 100 BPM | OXYGEN SATURATION: 99 % | WEIGHT: 174.16 LBS | RESPIRATION RATE: 18 BRPM | BODY MASS INDEX: 35.18 KG/M2 | SYSTOLIC BLOOD PRESSURE: 139 MMHG | TEMPERATURE: 97.8 F | DIASTOLIC BLOOD PRESSURE: 83 MMHG

## 2023-04-05 DIAGNOSIS — D72.829 ELEVATED WHITE BLOOD CELL COUNT, UNSPECIFIED: ICD-10-CM

## 2023-04-05 DIAGNOSIS — R71.8 OTHER ABNORMALITY OF RED BLOOD CELLS: ICD-10-CM

## 2023-04-05 PROCEDURE — 99213 OFFICE O/P EST LOW 20 MIN: CPT

## 2023-04-05 NOTE — PHYSICAL EXAM
[Fully active, able to carry on all pre-disease performance without restriction] : Status 0 - Fully active, able to carry on all pre-disease performance without restriction [Obese] : obese [Normal] : affect appropriate [Ulcers] : no ulcers [Mucositis] : no mucositis [Thrush] : no thrush [de-identified] : Dry mucous membranes

## 2023-04-05 NOTE — HISTORY OF PRESENT ILLNESS
[de-identified] : CHART REVIEW: 52 yo F with a PMH DM2 (not on insulin), PCOS, HTN, HLD, psoriasis, and hypothyroidism who presents with leukocytosis and elevated Hb. She states she has had chronic leukocytosis, usually around 15, since the 1990s. She has more recently noticed elevated Hb. She denies headache or blurry vision, recent significant weight changes, itching, joint pains, fevers, or night sweats. She also has no personal or family history of blood clots or abnormal bleeding. Her hypothyroidism is controlled (normal TSH) and her diabetes is improving. [de-identified] : 9/20/2021 \par Patient seen in follow up for pancytosis. she was last seen in 07/2021 when her H/H and platelets were WNL, WBC was boarder line high. She edorses no change in her health history. \par \par 4/5/2023\par Patient returns for a follow up for leukocytosis after being told by her cardiologist. Her most recent count is from 020/2023 showing WBC at 11.7,, normal range H/H and platelets. She endorses no change in health status other than losing about 25 lbs over a year after starting Ozempic. She offers no complaints.

## 2023-04-05 NOTE — RESULTS/DATA
[FreeTextEntry1] : 4/5/2023\par MOst recent CBC from 02/2023\par WBC 11.7,  ANC 8.53\par H/H, Plt normal range\par \par \par 9/20/2021\par Last labs on 7/7/21\par Normal range Hgb and Platelets, mild high WBC. \par \par \par \par 7/7/2021\par Available labs reviewed. \par Pancytosis  on 5/25/21 labs. \par Leukocytosis only in 6/21/2021 labs.

## 2023-04-05 NOTE — ASSESSMENT
[FreeTextEntry1] : 52 yo F with a PMH DM2 (not on insulin), PCOS, HTN, HLD, psoriasis, and hypothyroidism who is seen in consult with leukocytosis and elevated H/H.\par \par # Leukocytosis\par - Chronic, about 15-16K, likely due to PCOS \par - Feb 2023: WBC 11.7\par - Explained to patient, no intervention needed \par - Weight control\par \par # Polycythemia, resolved\par - Follow up H/H WNL \par - Ferritin: WNL\par - HFE: no mutation \par - Does not meet the criterion for JAK2 testing\par \par \par # Thrombocytosis \par - Resolved\par NOTE: No routine hematology follow up needed. \par

## 2023-04-07 ENCOUNTER — APPOINTMENT (OUTPATIENT)
Dept: MAMMOGRAPHY | Facility: IMAGING CENTER | Age: 53
End: 2023-04-07
Payer: COMMERCIAL

## 2023-04-07 ENCOUNTER — OUTPATIENT (OUTPATIENT)
Dept: OUTPATIENT SERVICES | Facility: HOSPITAL | Age: 53
LOS: 1 days | End: 2023-04-07
Payer: COMMERCIAL

## 2023-04-07 DIAGNOSIS — Z00.8 ENCOUNTER FOR OTHER GENERAL EXAMINATION: ICD-10-CM

## 2023-04-07 PROCEDURE — 77063 BREAST TOMOSYNTHESIS BI: CPT

## 2023-04-07 PROCEDURE — 77067 SCR MAMMO BI INCL CAD: CPT | Mod: 26

## 2023-04-07 PROCEDURE — 77063 BREAST TOMOSYNTHESIS BI: CPT | Mod: 26

## 2023-04-07 PROCEDURE — 77067 SCR MAMMO BI INCL CAD: CPT

## 2023-04-12 ENCOUNTER — APPOINTMENT (OUTPATIENT)
Dept: MAMMOGRAPHY | Facility: IMAGING CENTER | Age: 53
End: 2023-04-12
Payer: COMMERCIAL

## 2023-04-12 ENCOUNTER — OUTPATIENT (OUTPATIENT)
Dept: OUTPATIENT SERVICES | Facility: HOSPITAL | Age: 53
LOS: 1 days | End: 2023-04-12
Payer: COMMERCIAL

## 2023-04-12 DIAGNOSIS — Z00.8 ENCOUNTER FOR OTHER GENERAL EXAMINATION: ICD-10-CM

## 2023-04-12 PROCEDURE — 77065 DX MAMMO INCL CAD UNI: CPT

## 2023-04-12 PROCEDURE — 77065 DX MAMMO INCL CAD UNI: CPT | Mod: 26,LT

## 2023-05-01 ENCOUNTER — APPOINTMENT (OUTPATIENT)
Dept: ULTRASOUND IMAGING | Facility: CLINIC | Age: 53
End: 2023-05-01
Payer: COMMERCIAL

## 2023-05-01 ENCOUNTER — OUTPATIENT (OUTPATIENT)
Dept: OUTPATIENT SERVICES | Facility: HOSPITAL | Age: 53
LOS: 1 days | End: 2023-05-01
Payer: COMMERCIAL

## 2023-05-01 DIAGNOSIS — Z00.8 ENCOUNTER FOR OTHER GENERAL EXAMINATION: ICD-10-CM

## 2023-05-01 PROCEDURE — 76641 ULTRASOUND BREAST COMPLETE: CPT | Mod: 26,50

## 2023-05-01 PROCEDURE — 76641 ULTRASOUND BREAST COMPLETE: CPT

## 2023-05-17 NOTE — ED PROVIDER NOTE - CARE PLAN
Principal Discharge DX:	Lumbosacral strain, initial encounter   Path Notes (To The Dermatopathologist): Please check margins.

## 2023-06-12 ENCOUNTER — APPOINTMENT (OUTPATIENT)
Dept: ENDOCRINOLOGY | Facility: CLINIC | Age: 53
End: 2023-06-12
Payer: COMMERCIAL

## 2023-06-12 VITALS
DIASTOLIC BLOOD PRESSURE: 60 MMHG | SYSTOLIC BLOOD PRESSURE: 120 MMHG | HEART RATE: 89 BPM | HEIGHT: 59 IN | BODY MASS INDEX: 34.47 KG/M2 | OXYGEN SATURATION: 97 % | TEMPERATURE: 97 F | WEIGHT: 171 LBS

## 2023-06-12 LAB
GLUCOSE BLDC GLUCOMTR-MCNC: 130
HBA1C MFR BLD HPLC: 6

## 2023-06-12 PROCEDURE — 82962 GLUCOSE BLOOD TEST: CPT

## 2023-06-12 PROCEDURE — 83036 HEMOGLOBIN GLYCOSYLATED A1C: CPT | Mod: QW

## 2023-06-12 PROCEDURE — 36415 COLL VENOUS BLD VENIPUNCTURE: CPT

## 2023-06-12 PROCEDURE — 99214 OFFICE O/P EST MOD 30 MIN: CPT | Mod: 25

## 2023-06-12 NOTE — HISTORY OF PRESENT ILLNESS
[FreeTextEntry1] : Ms. MARTINEZ is a 53 year old female who returns to regard to a history of type 2  Diabetes since 1993. There is no known history  nephropathy. With regard to neuropathy, she does note twitching of left hand. Recently retinal swelling and leaking  adh had laser tx per Dr. Ken Gallagher.\par \par Patient is currently on glimepiride 1 mg daily HS and Jardiance 25 mg daily-unable to tolerate Synjardy. Too is on OZempic 1 mg sc weekly.\par tolerating ozempic well. She too notes appetite suppression. Overall has lost 25 lbs on ozempic. Since last visit  has lost 3-4 lbs since feb 2023. \par \par There has been no significant hypoglycemia. Patient reports determining whether or not to take glimepiride based on how low her BG runs-For instance, patient states if BG < 80, she will not take glimepiride.\par \par  Today's weight =    171 lb         previously 195 lbs in August 2022. She denies any chest pain, sob, neurologic or ophthalmologic complaints. She too denies any new podiatric concerns. She is due for opthalmology visit  in May \par \par In the past, patient was on metformin -however had GI side effects and stopped. Patient was on insulin while attempting to conceive. ? did use  byetta inj, stopped due increased sugar craving.\par \par Patient checks BG's at home manually 1-3 times daily. HGM values in AM running .  Does have some values at 86, 87 around 12 pm before first meal of the day. Did have value of 66 before dinner, no symptoms of hypoglycemia. \par  Patient denies any episodes of hypoglycemia. \par \par Patient states she does carry out intermittent fasting.\par \par POCT glucose returned today at 130  \par POCT A1c returned today at 6.0 ; prior 6.9% on 2/13/23 \par \par Is taking  LT4 50 mcg and daily vitamin D 3000 iu daily\par \par Additional medical history includes that of elevated BP and hypothyroidism. Does take the LT4 noted above along with Amlodipine -couldn't tolerate Labetalol. Too is taking Rosuvastatin 20 mg, asa 81 \par Following with cardiologist Dr. Sung\par \par Menses very irreg-up to 3 motnhs without a cycle-she would like too check hormone levels.-some hot flashes, and trouble sleeping\par

## 2023-06-13 LAB
25(OH)D3 SERPL-MCNC: 44.8 NG/ML
ALBUMIN SERPL ELPH-MCNC: 4.6 G/DL
ALP BLD-CCNC: 70 U/L
ALT SERPL-CCNC: 30 U/L
ANION GAP SERPL CALC-SCNC: 13 MMOL/L
AST SERPL-CCNC: 30 U/L
BILIRUB SERPL-MCNC: 0.3 MG/DL
BUN SERPL-MCNC: 17 MG/DL
CALCIUM SERPL-MCNC: 10 MG/DL
CHLORIDE SERPL-SCNC: 108 MMOL/L
CHOLEST SERPL-MCNC: 135 MG/DL
CO2 SERPL-SCNC: 23 MMOL/L
CREAT SERPL-MCNC: 0.5 MG/DL
CREAT SPEC-SCNC: 54 MG/DL
EGFR: 113 ML/MIN/1.73M2
ESTIMATED AVERAGE GLUCOSE: 143 MG/DL
FOLATE SERPL-MCNC: >20 NG/ML
FRUCTOSAMINE SERPL-MCNC: 233 UMOL/L
GLUCOSE SERPL-MCNC: 117 MG/DL
GLYCOMARK.: 0.4 UG/ML
HBA1C MFR BLD HPLC: 6.6 %
HDLC SERPL-MCNC: 69 MG/DL
LDLC SERPL CALC-MCNC: 58 MG/DL
LDLC SERPL DIRECT ASSAY-MCNC: 60 MG/DL
MAGNESIUM SERPL-MCNC: 2.3 MG/DL
MICROALBUMIN 24H UR DL<=1MG/L-MCNC: <1.2 MG/DL
MICROALBUMIN/CREAT 24H UR-RTO: NORMAL MG/G
NONHDLC SERPL-MCNC: 66 MG/DL
POTASSIUM SERPL-SCNC: 4.5 MMOL/L
PROT SERPL-MCNC: 7.3 G/DL
SODIUM SERPL-SCNC: 144 MMOL/L
T3FREE SERPL-MCNC: 2.91 PG/ML
T4 FREE SERPL-MCNC: 1.5 NG/DL
TRIGL SERPL-MCNC: 40 MG/DL
TSH SERPL-ACNC: 1.81 UIU/ML
VIT B12 SERPL-MCNC: 865 PG/ML

## 2023-07-18 ENCOUNTER — RX RENEWAL (OUTPATIENT)
Age: 53
End: 2023-07-18

## 2023-07-20 RX ORDER — GLIMEPIRIDE 1 MG/1
1 TABLET ORAL
Qty: 90 | Refills: 1 | Status: DISCONTINUED | COMMUNITY
Start: 2021-05-26 | End: 2023-07-20

## 2023-08-24 NOTE — ED ADULT NURSE NOTE - ALCOHOL PRE SCREEN (AUDIT - C)
Detail Level: Simple Plan: Prior to [facility], numerous AKs frozen on forehead Statement Selected Detail Level: Zone Plan: 08/24/2023:\\nApply Fluorouracil cream on face including mustache area qHS x 2-3 weeks \\nFollow Up 2 Weeks into treatment.

## 2023-09-18 ENCOUNTER — APPOINTMENT (OUTPATIENT)
Dept: ENDOCRINOLOGY | Facility: CLINIC | Age: 53
End: 2023-09-18
Payer: COMMERCIAL

## 2023-09-18 VITALS
HEIGHT: 59 IN | DIASTOLIC BLOOD PRESSURE: 75 MMHG | HEART RATE: 108 BPM | SYSTOLIC BLOOD PRESSURE: 110 MMHG | BODY MASS INDEX: 33.56 KG/M2 | WEIGHT: 166.44 LBS | OXYGEN SATURATION: 96 % | TEMPERATURE: 97 F

## 2023-09-18 LAB
GLUCOSE BLDC GLUCOMTR-MCNC: 98
HBA1C MFR BLD HPLC: 6.8

## 2023-09-18 PROCEDURE — 99214 OFFICE O/P EST MOD 30 MIN: CPT

## 2023-09-18 PROCEDURE — 83036 HEMOGLOBIN GLYCOSYLATED A1C: CPT | Mod: QW

## 2023-09-18 PROCEDURE — 82962 GLUCOSE BLOOD TEST: CPT

## 2023-09-18 RX ORDER — EMPAGLIFLOZIN 25 MG/1
25 TABLET, FILM COATED ORAL DAILY
Qty: 90 | Refills: 3 | Status: ACTIVE | COMMUNITY
Start: 2021-05-28 | End: 1900-01-01

## 2023-10-05 ENCOUNTER — RX RENEWAL (OUTPATIENT)
Age: 53
End: 2023-10-05

## 2023-11-11 ENCOUNTER — OUTPATIENT (OUTPATIENT)
Dept: OUTPATIENT SERVICES | Facility: HOSPITAL | Age: 53
LOS: 1 days | End: 2023-11-11
Payer: COMMERCIAL

## 2023-11-11 ENCOUNTER — APPOINTMENT (OUTPATIENT)
Dept: MAMMOGRAPHY | Facility: IMAGING CENTER | Age: 53
End: 2023-11-11
Payer: COMMERCIAL

## 2023-11-11 DIAGNOSIS — Z00.8 ENCOUNTER FOR OTHER GENERAL EXAMINATION: ICD-10-CM

## 2023-11-11 DIAGNOSIS — Z12.31 ENCOUNTER FOR SCREENING MAMMOGRAM FOR MALIGNANT NEOPLASM OF BREAST: ICD-10-CM

## 2023-11-11 DIAGNOSIS — R92.8 OTHER ABNORMAL AND INCONCLUSIVE FINDINGS ON DIAGNOSTIC IMAGING OF BREAST: ICD-10-CM

## 2023-11-11 PROCEDURE — 77065 DX MAMMO INCL CAD UNI: CPT

## 2023-11-11 PROCEDURE — 77065 DX MAMMO INCL CAD UNI: CPT | Mod: 26,LT

## 2023-12-27 ENCOUNTER — RX RENEWAL (OUTPATIENT)
Age: 53
End: 2023-12-27

## 2024-01-11 ENCOUNTER — APPOINTMENT (OUTPATIENT)
Dept: RADIOLOGY | Facility: IMAGING CENTER | Age: 54
End: 2024-01-11
Payer: COMMERCIAL

## 2024-01-11 ENCOUNTER — OUTPATIENT (OUTPATIENT)
Dept: OUTPATIENT SERVICES | Facility: HOSPITAL | Age: 54
LOS: 1 days | End: 2024-01-11
Payer: COMMERCIAL

## 2024-01-11 DIAGNOSIS — M25.512 PAIN IN LEFT SHOULDER: ICD-10-CM

## 2024-01-11 PROCEDURE — 73030 X-RAY EXAM OF SHOULDER: CPT | Mod: 26,LT

## 2024-01-11 PROCEDURE — 73030 X-RAY EXAM OF SHOULDER: CPT

## 2024-01-15 ENCOUNTER — RX RENEWAL (OUTPATIENT)
Age: 54
End: 2024-01-15

## 2024-01-18 ENCOUNTER — NON-APPOINTMENT (OUTPATIENT)
Age: 54
End: 2024-01-18

## 2024-01-29 ENCOUNTER — LABORATORY RESULT (OUTPATIENT)
Age: 54
End: 2024-01-29

## 2024-01-29 ENCOUNTER — NON-APPOINTMENT (OUTPATIENT)
Age: 54
End: 2024-01-29

## 2024-01-29 ENCOUNTER — APPOINTMENT (OUTPATIENT)
Dept: ENDOCRINOLOGY | Facility: CLINIC | Age: 54
End: 2024-01-29
Payer: COMMERCIAL

## 2024-01-29 ENCOUNTER — APPOINTMENT (OUTPATIENT)
Dept: CARDIOLOGY | Facility: CLINIC | Age: 54
End: 2024-01-29
Payer: COMMERCIAL

## 2024-01-29 VITALS
DIASTOLIC BLOOD PRESSURE: 80 MMHG | HEIGHT: 59 IN | OXYGEN SATURATION: 97 % | HEART RATE: 76 BPM | WEIGHT: 155 LBS | SYSTOLIC BLOOD PRESSURE: 134 MMHG | TEMPERATURE: 98.3 F | BODY MASS INDEX: 31.25 KG/M2

## 2024-01-29 DIAGNOSIS — M25.512 PAIN IN LEFT SHOULDER: ICD-10-CM

## 2024-01-29 DIAGNOSIS — Z00.00 ENCOUNTER FOR GENERAL ADULT MEDICAL EXAMINATION W/OUT ABNORMAL FINDINGS: ICD-10-CM

## 2024-01-29 DIAGNOSIS — R00.0 TACHYCARDIA, UNSPECIFIED: ICD-10-CM

## 2024-01-29 PROCEDURE — 82962 GLUCOSE BLOOD TEST: CPT

## 2024-01-29 PROCEDURE — 99214 OFFICE O/P EST MOD 30 MIN: CPT

## 2024-01-29 PROCEDURE — 93000 ELECTROCARDIOGRAM COMPLETE: CPT

## 2024-01-29 RX ORDER — NIRMATRELVIR AND RITONAVIR 300-100 MG
20 X 150 MG & KIT ORAL
Qty: 1 | Refills: 0 | Status: DISCONTINUED | COMMUNITY
Start: 2023-02-28 | End: 2024-01-29

## 2024-01-29 NOTE — ADDENDUM
[FreeTextEntry1] : poct glucose and a1c were carried out today given diabetes diagnosis blood will be drawn in the office today  Today's evaluation reviewed with Dr. Randle along with any changes and plan of care.

## 2024-01-29 NOTE — HISTORY OF PRESENT ILLNESS
[FreeTextEntry1] : CHANTEL MARTINEZ  is a 53 year old F w/ pmhx of DM2, hypothyroidism, HLD, CAD  who presents today for an annual physical exam. The patient denies fever, chills, sore throat, loss of taste or smell, muscle aches weight loss, malaise, rash, recent travel, insect bites, alteration bowel habits, headaches, weakness, abdominal  pain, bloating, changes in urination, visual disturbances, shortness of breath, chest pain, dizziness, palpitations.  The patient also presents for continued care of diabetes mellitus. Patient is following the diabetic regimen. Patient is tolerating hypoglycemic agents and following the diet. Patient denies any visual changes, blood in the urine, abdominal pain, nausea, vomiting, myalgias, arthralgias, paresthesias and syncope. The patient denies any chest discomfort, shortness of breath or new neurologic signs or symptoms. Patient denies any polyuria, worsening nocturia, or polydipsia.  The patient is here for follow-up of hypothyroidism. The patient states that they are taking their medicine regularly that they have no new complaints. They deny any  heat or cold intolerance hair loss and fatigue.  The patient presents for routine follow up of their coronary artery disease.   The patient has been following all secondary prevents measures and antiplatelet therapy. The patient denies shortness of breath, chest pain, dizziness, palpitations.

## 2024-01-29 NOTE — HISTORY OF PRESENT ILLNESS
[FreeTextEntry1] : Ms. MARTINEZ is a 53 year old female who returns to regard to a history of type 2 Diabetes since 1993. There is no known history nephropathy. With regard to neuropathy, she does note twitching of left hand. Recently retinal swelling and leaking and had laser tx per Dr. Ken Gallagher.  Patient is currently on Jardiance 25 mg daily-unable to tolerate Synjardy. Too is on Ozempic 2 mg sc weekly.   The patient is tolerating Ozempic well. She too notes appetite suppression.  Her current weight is  155 lbs   previously 166 lbs in sept 2023   In the past, patient was on metformin -however had GI side effects and stopped. Patient was on insulin while attempting to conceive. ? did use byetta inj, stopped due increased sugar craving.  There has been no significant hypoglycemia.  HGM values shown to be running   She denies any chest pain, sob, neurologic or ophthalmologic complaints. She denies polyuria, polydipsia, or any visual changes. She also denies any skin lesions, skin breakdown or non-healing areas of skin. She denies any podiatric concerns. Ophthalmologic evaluation is due.   previously  6.8  % on 9/18/2023 POCT glucose returned today at    88 mg/dl  Additional medical history includes that of elevated BP and hypothyroidism. Does take the LT4 50 mcg daily. For the HTN, she is taking Amlodipine -couldn't tolerate Labetalol. Too is taking Rosuvastatin 20 mg, asa 81 mg, and vitamin D 2000 iu daily.  Following with cardiologist Dr. Sung  Menses very irreg-up to 3 months without a cycle currently menstruating, prior cycle early jan

## 2024-01-30 LAB
25(OH)D3 SERPL-MCNC: 39.3 NG/ML
ALBUMIN SERPL ELPH-MCNC: 4.5 G/DL
ALP BLD-CCNC: 68 U/L
ALT SERPL-CCNC: 24 U/L
ANION GAP SERPL CALC-SCNC: 17 MMOL/L
AST SERPL-CCNC: 27 U/L
BASOPHILS # BLD AUTO: 0.08 K/UL
BASOPHILS NFR BLD AUTO: 0.6 %
BILIRUB DIRECT SERPL-MCNC: 0.2 MG/DL
BILIRUB INDIRECT SERPL-MCNC: 0.5 MG/DL
BILIRUB SERPL-MCNC: 0.6 MG/DL
BUN SERPL-MCNC: 9 MG/DL
CALCIUM SERPL-MCNC: 9.6 MG/DL
CHLORIDE SERPL-SCNC: 102 MMOL/L
CHOLEST SERPL-MCNC: 120 MG/DL
CK SERPL-CCNC: 38 U/L
CO2 SERPL-SCNC: 18 MMOL/L
CREAT SERPL-MCNC: 0.4 MG/DL
EGFR: 119 ML/MIN/1.73M2
EOSINOPHIL # BLD AUTO: 0.07 K/UL
EOSINOPHIL NFR BLD AUTO: 0.5 %
ESTIMATED AVERAGE GLUCOSE: 151 MG/DL
FRUCTOSAMINE SERPL-MCNC: 264 UMOL/L
GLUCOSE SERPL-MCNC: 78 MG/DL
GLYCOMARK.: 0.5 UG/ML
HBA1C MFR BLD HPLC: 6.9 %
HCT VFR BLD CALC: 45.7 %
HDLC SERPL-MCNC: 58 MG/DL
HGB BLD-MCNC: 14.6 G/DL
IMM GRANULOCYTES NFR BLD AUTO: 0.4 %
LDLC SERPL CALC-MCNC: 51 MG/DL
LDLC SERPL DIRECT ASSAY-MCNC: 47 MG/DL
LYMPHOCYTES # BLD AUTO: 2.68 K/UL
LYMPHOCYTES NFR BLD AUTO: 19.3 %
MAGNESIUM SERPL-MCNC: 1.9 MG/DL
MAN DIFF?: NORMAL
MCHC RBC-ENTMCNC: 26.8 PG
MCHC RBC-ENTMCNC: 31.9 GM/DL
MCV RBC AUTO: 84 FL
MONOCYTES # BLD AUTO: 0.76 K/UL
MONOCYTES NFR BLD AUTO: 5.5 %
NEUTROPHILS # BLD AUTO: 10.26 K/UL
NEUTROPHILS NFR BLD AUTO: 73.7 %
NONHDLC SERPL-MCNC: 62 MG/DL
NT-PROBNP SERPL-MCNC: 36 PG/ML
OSMOLALITY SERPL: 291 MOSMOL/KG
PHOSPHATE SERPL-MCNC: 2.9 MG/DL
PLATELET # BLD AUTO: 315 K/UL
POTASSIUM SERPL-SCNC: 4.1 MMOL/L
PROT SERPL-MCNC: 7.3 G/DL
RBC # BLD: 5.44 M/UL
RBC # FLD: 13.9 %
SODIUM SERPL-SCNC: 138 MMOL/L
T3FREE SERPL-MCNC: 2.1 PG/ML
T3RU NFR SERPL: 0.9 TBI
T4 FREE SERPL-MCNC: 1.8 NG/DL
T4 SERPL-MCNC: 10.9 UG/DL
TRIGL SERPL-MCNC: 49 MG/DL
TSH SERPL-ACNC: 1.67 UIU/ML
URATE SERPL-MCNC: 3.2 MG/DL
WBC # FLD AUTO: 13.91 K/UL

## 2024-01-31 LAB
ESTRADIOL SERPL-MCNC: 50 PG/ML
FSH SERPL-MCNC: 3.6 IU/L
LH SERPL-ACNC: 1.3 IU/L

## 2024-02-01 LAB — TESTOST SERPL-MCNC: <2.5 NG/DL

## 2024-03-02 ENCOUNTER — RX RENEWAL (OUTPATIENT)
Age: 54
End: 2024-03-02

## 2024-03-05 ENCOUNTER — TRANSCRIPTION ENCOUNTER (OUTPATIENT)
Age: 54
End: 2024-03-05

## 2024-03-05 RX ORDER — LANCETS 33 GAUGE
EACH MISCELLANEOUS
Qty: 300 | Refills: 3 | Status: ACTIVE | COMMUNITY
Start: 2024-03-05 | End: 1900-01-01

## 2024-03-05 RX ORDER — BLOOD SUGAR DIAGNOSTIC
STRIP MISCELLANEOUS
Qty: 300 | Refills: 3 | Status: ACTIVE | COMMUNITY
Start: 2024-03-05 | End: 1900-01-01

## 2024-03-05 RX ORDER — BLOOD-GLUCOSE METER
W/DEVICE EACH MISCELLANEOUS
Qty: 1 | Refills: 0 | Status: ACTIVE | COMMUNITY
Start: 2024-03-05 | End: 1900-01-01

## 2024-03-05 RX ORDER — BLOOD-GLUCOSE CONTROL, NORMAL
EACH MISCELLANEOUS
Qty: 1 | Refills: 0 | Status: ACTIVE | COMMUNITY
Start: 2024-03-05 | End: 1900-01-01

## 2024-03-12 LAB — GLUCOSE BLDC GLUCOMTR-MCNC: 88

## 2024-03-23 ENCOUNTER — RX RENEWAL (OUTPATIENT)
Age: 54
End: 2024-03-23

## 2024-04-24 RX ORDER — LEVOTHYROXINE SODIUM 0.05 MG/1
50 TABLET ORAL DAILY
Qty: 90 | Refills: 1 | Status: ACTIVE | COMMUNITY
Start: 2021-05-28 | End: 1900-01-01

## 2024-05-05 ENCOUNTER — RX RENEWAL (OUTPATIENT)
Age: 54
End: 2024-05-05

## 2024-05-13 ENCOUNTER — APPOINTMENT (OUTPATIENT)
Dept: OPHTHALMOLOGY | Facility: CLINIC | Age: 54
End: 2024-05-13
Payer: COMMERCIAL

## 2024-05-13 ENCOUNTER — NON-APPOINTMENT (OUTPATIENT)
Age: 54
End: 2024-05-13

## 2024-05-13 PROCEDURE — 92014 COMPRE OPH EXAM EST PT 1/>: CPT

## 2024-05-13 PROCEDURE — 92134 CPTRZ OPH DX IMG PST SGM RTA: CPT

## 2024-05-20 ENCOUNTER — APPOINTMENT (OUTPATIENT)
Dept: CARDIOLOGY | Facility: CLINIC | Age: 54
End: 2024-05-20
Payer: COMMERCIAL

## 2024-05-20 PROCEDURE — 93880 EXTRACRANIAL BILAT STUDY: CPT

## 2024-05-20 PROCEDURE — 93306 TTE W/DOPPLER COMPLETE: CPT

## 2024-05-23 ENCOUNTER — APPOINTMENT (OUTPATIENT)
Dept: ULTRASOUND IMAGING | Facility: IMAGING CENTER | Age: 54
End: 2024-05-23
Payer: COMMERCIAL

## 2024-05-23 ENCOUNTER — OUTPATIENT (OUTPATIENT)
Dept: OUTPATIENT SERVICES | Facility: HOSPITAL | Age: 54
LOS: 1 days | End: 2024-05-23
Payer: COMMERCIAL

## 2024-05-23 ENCOUNTER — APPOINTMENT (OUTPATIENT)
Dept: MAMMOGRAPHY | Facility: IMAGING CENTER | Age: 54
End: 2024-05-23
Payer: COMMERCIAL

## 2024-05-23 DIAGNOSIS — Z00.8 ENCOUNTER FOR OTHER GENERAL EXAMINATION: ICD-10-CM

## 2024-05-23 DIAGNOSIS — R92.8 OTHER ABNORMAL AND INCONCLUSIVE FINDINGS ON DIAGNOSTIC IMAGING OF BREAST: ICD-10-CM

## 2024-05-23 PROCEDURE — 76641 ULTRASOUND BREAST COMPLETE: CPT

## 2024-05-23 PROCEDURE — 76641 ULTRASOUND BREAST COMPLETE: CPT | Mod: 26,50

## 2024-05-23 PROCEDURE — 77066 DX MAMMO INCL CAD BI: CPT | Mod: 26

## 2024-05-23 PROCEDURE — G0279: CPT | Mod: 26

## 2024-05-23 PROCEDURE — 77066 DX MAMMO INCL CAD BI: CPT

## 2024-05-23 PROCEDURE — G0279: CPT

## 2024-06-03 ENCOUNTER — RESULT REVIEW (OUTPATIENT)
Age: 54
End: 2024-06-03

## 2024-06-04 ENCOUNTER — RESULT REVIEW (OUTPATIENT)
Age: 54
End: 2024-06-04

## 2024-06-04 ENCOUNTER — OUTPATIENT (OUTPATIENT)
Dept: OUTPATIENT SERVICES | Facility: HOSPITAL | Age: 54
LOS: 1 days | End: 2024-06-04
Payer: COMMERCIAL

## 2024-06-04 ENCOUNTER — RX RENEWAL (OUTPATIENT)
Age: 54
End: 2024-06-04

## 2024-06-04 ENCOUNTER — APPOINTMENT (OUTPATIENT)
Dept: ULTRASOUND IMAGING | Facility: IMAGING CENTER | Age: 54
End: 2024-06-04
Payer: COMMERCIAL

## 2024-06-04 DIAGNOSIS — Z00.8 ENCOUNTER FOR OTHER GENERAL EXAMINATION: ICD-10-CM

## 2024-06-04 DIAGNOSIS — Z00.00 ENCOUNTER FOR GENERAL ADULT MEDICAL EXAMINATION WITHOUT ABNORMAL FINDINGS: ICD-10-CM

## 2024-06-04 PROCEDURE — 77065 DX MAMMO INCL CAD UNI: CPT | Mod: 26,LT

## 2024-06-04 PROCEDURE — 77065 DX MAMMO INCL CAD UNI: CPT

## 2024-06-04 PROCEDURE — 19083 BX BREAST 1ST LESION US IMAG: CPT

## 2024-06-04 PROCEDURE — 19083 BX BREAST 1ST LESION US IMAG: CPT | Mod: LT

## 2024-06-04 PROCEDURE — A4648: CPT

## 2024-06-04 PROCEDURE — 88305 TISSUE EXAM BY PATHOLOGIST: CPT | Mod: 26

## 2024-06-04 RX ORDER — ROSUVASTATIN CALCIUM 20 MG/1
20 TABLET, FILM COATED ORAL
Qty: 90 | Refills: 1 | Status: ACTIVE | COMMUNITY
Start: 2021-05-28 | End: 1900-01-01

## 2024-06-12 LAB — SURGICAL PATHOLOGY STUDY: SIGNIFICANT CHANGE UP

## 2024-06-12 RX ORDER — SEMAGLUTIDE 2.68 MG/ML
8 INJECTION, SOLUTION SUBCUTANEOUS
Qty: 1 | Refills: 1 | Status: ACTIVE | COMMUNITY
Start: 2022-08-15 | End: 1900-01-01

## 2024-06-24 ENCOUNTER — APPOINTMENT (OUTPATIENT)
Dept: ENDOCRINOLOGY | Facility: CLINIC | Age: 54
End: 2024-06-24
Payer: COMMERCIAL

## 2024-06-24 ENCOUNTER — APPOINTMENT (OUTPATIENT)
Dept: CARDIOLOGY | Facility: CLINIC | Age: 54
End: 2024-06-24
Payer: COMMERCIAL

## 2024-06-24 ENCOUNTER — NON-APPOINTMENT (OUTPATIENT)
Age: 54
End: 2024-06-24

## 2024-06-24 VITALS
HEART RATE: 99 BPM | WEIGHT: 154 LBS | BODY MASS INDEX: 31.04 KG/M2 | DIASTOLIC BLOOD PRESSURE: 70 MMHG | HEIGHT: 59 IN | TEMPERATURE: 98.1 F | OXYGEN SATURATION: 99 % | SYSTOLIC BLOOD PRESSURE: 110 MMHG

## 2024-06-24 DIAGNOSIS — E78.5 HYPERLIPIDEMIA, UNSPECIFIED: ICD-10-CM

## 2024-06-24 DIAGNOSIS — E11.9 TYPE 2 DIABETES MELLITUS W/OUT COMPLICATIONS: ICD-10-CM

## 2024-06-24 DIAGNOSIS — I25.10 ATHEROSCLEROTIC HEART DISEASE OF NATIVE CORONARY ARTERY W/OUT ANGINA PECTORIS: ICD-10-CM

## 2024-06-24 DIAGNOSIS — E03.9 HYPOTHYROIDISM, UNSPECIFIED: ICD-10-CM

## 2024-06-24 DIAGNOSIS — R93.1 ABNORMAL FINDINGS ON DIAGNOSTIC IMAGING OF HEART AND CORONARY CIRCULATION: ICD-10-CM

## 2024-06-24 DIAGNOSIS — R91.1 SOLITARY PULMONARY NODULE: ICD-10-CM

## 2024-06-24 DIAGNOSIS — I10 ESSENTIAL (PRIMARY) HYPERTENSION: ICD-10-CM

## 2024-06-24 DIAGNOSIS — E55.9 VITAMIN D DEFICIENCY, UNSPECIFIED: ICD-10-CM

## 2024-06-24 DIAGNOSIS — E28.2 POLYCYSTIC OVARIAN SYNDROME: ICD-10-CM

## 2024-06-24 LAB
GLUCOSE BLDC GLUCOMTR-MCNC: 101
GLUCOSE BLDC GLUCOMTR-MCNC: 62
GLUCOSE BLDC GLUCOMTR-MCNC: 72
HBA1C MFR BLD HPLC: 6.4

## 2024-06-24 PROCEDURE — 99214 OFFICE O/P EST MOD 30 MIN: CPT

## 2024-06-24 PROCEDURE — 93000 ELECTROCARDIOGRAM COMPLETE: CPT

## 2024-06-24 PROCEDURE — 82962 GLUCOSE BLOOD TEST: CPT

## 2024-06-24 PROCEDURE — 83036 HEMOGLOBIN GLYCOSYLATED A1C: CPT | Mod: QW

## 2024-06-24 RX ORDER — EZETIMIBE 10 MG/1
10 TABLET ORAL
Qty: 90 | Refills: 1 | Status: ACTIVE | COMMUNITY
Start: 2024-06-24 | End: 1900-01-01

## 2024-06-25 LAB
ALBUMIN SERPL ELPH-MCNC: 4.5 G/DL
ALP BLD-CCNC: 67 U/L
ALT SERPL-CCNC: 22 U/L
APO B SERPL-MCNC: 58 MG/DL
AST SERPL-CCNC: 28 U/L
BILIRUB DIRECT SERPL-MCNC: 0.2 MG/DL
BILIRUB INDIRECT SERPL-MCNC: 0.5 MG/DL
BILIRUB SERPL-MCNC: 0.6 MG/DL
CHOLEST SERPL-MCNC: 125 MG/DL
CK SERPL-CCNC: 35 U/L
CRP SERPL HS-MCNC: 1.71 MG/L
HDLC SERPL-MCNC: 65 MG/DL
LDLC SERPL CALC-MCNC: 49 MG/DL
NONHDLC SERPL-MCNC: 60 MG/DL
PROT SERPL-MCNC: 6.7 G/DL
TRIGL SERPL-MCNC: 48 MG/DL

## 2024-06-25 RX ORDER — AMLODIPINE BESYLATE 5 MG/1
5 TABLET ORAL DAILY
Qty: 1 | Refills: 1 | Status: ACTIVE | COMMUNITY
Start: 2021-05-28 | End: 1900-01-01

## 2024-06-26 LAB — APO LP(A) SERPL-MCNC: 46.5 NMOL/L

## 2024-06-27 LAB
25(OH)D3 SERPL-MCNC: 48.5 NG/ML
ALBUMIN SERPL ELPH-MCNC: 4.6 G/DL
ALP BLD-CCNC: 71 U/L
ALT SERPL-CCNC: 21 U/L
ANION GAP SERPL CALC-SCNC: 20 MMOL/L
AST SERPL-CCNC: 27 U/L
BILIRUB SERPL-MCNC: 0.6 MG/DL
BUN SERPL-MCNC: 13 MG/DL
CALCIUM SERPL-MCNC: 9.8 MG/DL
CHLORIDE SERPL-SCNC: 104 MMOL/L
CHOLEST SERPL-MCNC: 127 MG/DL
CO2 SERPL-SCNC: 17 MMOL/L
CREAT SERPL-MCNC: 0.48 MG/DL
EGFR: 113 ML/MIN/1.73M2
FRUCTOSAMINE SERPL-MCNC: 245 UMOL/L
GLUCOSE SERPL-MCNC: 77 MG/DL
GLYCOMARK.: 2 UG/ML
HDLC SERPL-MCNC: 64 MG/DL
LDLC SERPL DIRECT ASSAY-MCNC: 49 MG/DL
MAGNESIUM SERPL-MCNC: 2.2 MG/DL
POTASSIUM SERPL-SCNC: 4.4 MMOL/L
PROT SERPL-MCNC: 6.9 G/DL
SODIUM SERPL-SCNC: 141 MMOL/L
T3FREE SERPL-MCNC: 2.25 PG/ML
T4 FREE SERPL-MCNC: 1.7 NG/DL
TRIGL SERPL-MCNC: 50 MG/DL
TSH SERPL-ACNC: 1.15 UIU/ML

## 2024-07-01 LAB
BASOPHILS # BLD AUTO: 0.04 K/UL
BASOPHILS NFR BLD AUTO: 0.5 %
CREAT SPEC-SCNC: 35 MG/DL
EOSINOPHIL # BLD AUTO: 0.2 K/UL
EOSINOPHIL NFR BLD AUTO: 2.5 %
ESTIMATED AVERAGE GLUCOSE: 151 MG/DL
HBA1C MFR BLD HPLC: 6.9 %
HCT VFR BLD CALC: 43.1 %
HGB BLD-MCNC: 13.2 G/DL
IMM GRANULOCYTES NFR BLD AUTO: 0.1 %
LYMPHOCYTES # BLD AUTO: 2.05 K/UL
LYMPHOCYTES NFR BLD AUTO: 25.4 %
MAN DIFF?: NORMAL
MCHC RBC-ENTMCNC: 27 PG
MCHC RBC-ENTMCNC: 30.6 GM/DL
MCV RBC AUTO: 88.3 FL
MICROALBUMIN 24H UR DL<=1MG/L-MCNC: <1.2 MG/DL
MICROALBUMIN/CREAT 24H UR-RTO: NORMAL MG/G
MONOCYTES # BLD AUTO: 0.54 K/UL
MONOCYTES NFR BLD AUTO: 6.7 %
NEUTROPHILS # BLD AUTO: 5.22 K/UL
NEUTROPHILS NFR BLD AUTO: 64.8 %
PLATELET # BLD AUTO: 273 K/UL
RBC # BLD: 4.88 M/UL
RBC # FLD: 12.2 %
WBC # FLD AUTO: 8.06 K/UL

## 2024-07-05 ENCOUNTER — RX RENEWAL (OUTPATIENT)
Age: 54
End: 2024-07-05

## 2024-07-20 ENCOUNTER — APPOINTMENT (OUTPATIENT)
Dept: CT IMAGING | Facility: IMAGING CENTER | Age: 54
End: 2024-07-20

## 2024-07-20 PROCEDURE — 71250 CT THORAX DX C-: CPT | Mod: 26

## 2024-08-05 ENCOUNTER — NON-APPOINTMENT (OUTPATIENT)
Age: 54
End: 2024-08-05

## 2024-08-06 ENCOUNTER — NON-APPOINTMENT (OUTPATIENT)
Age: 54
End: 2024-08-06

## 2024-08-26 ENCOUNTER — APPOINTMENT (OUTPATIENT)
Dept: PULMONOLOGY | Facility: CLINIC | Age: 54
End: 2024-08-26
Payer: COMMERCIAL

## 2024-08-26 ENCOUNTER — NON-APPOINTMENT (OUTPATIENT)
Age: 54
End: 2024-08-26

## 2024-08-26 VITALS — DIASTOLIC BLOOD PRESSURE: 71 MMHG | SYSTOLIC BLOOD PRESSURE: 110 MMHG | HEART RATE: 96 BPM | OXYGEN SATURATION: 98 %

## 2024-08-26 DIAGNOSIS — R91.1 SOLITARY PULMONARY NODULE: ICD-10-CM

## 2024-08-26 DIAGNOSIS — Z87.891 PERSONAL HISTORY OF NICOTINE DEPENDENCE: ICD-10-CM

## 2024-08-26 PROCEDURE — 94729 DIFFUSING CAPACITY: CPT

## 2024-08-26 PROCEDURE — 94010 BREATHING CAPACITY TEST: CPT

## 2024-08-26 PROCEDURE — 94727 GAS DIL/WSHOT DETER LNG VOL: CPT

## 2024-08-26 PROCEDURE — 99204 OFFICE O/P NEW MOD 45 MIN: CPT | Mod: 25

## 2024-08-26 PROCEDURE — ZZZZZ: CPT

## 2024-08-26 NOTE — PHYSICAL EXAM
[No Acute Distress] : no acute distress [Well Nourished] : well nourished [Well Developed] : well developed [No Resp Distress] : no resp distress [No Acc Muscle Use] : no acc muscle use [Clear to Auscultation Bilaterally] : clear to auscultation bilaterally [No Focal Deficits] : no focal deficits

## 2024-08-26 NOTE — HISTORY OF PRESENT ILLNESS
[Former] : former [< 20 pack-years] : < 20 pack-years [TextBox_4] : CHANTEL MARTINEZ is a 54 year old female who presents with abnormal CT chest- pulm nodules PMH: former smoker, quit about 20 years ago, HTN, HLD, DM type 2  had CT chest done in 2022  covid in 2023  another ct chest in 2023- one nodule grown in size  no history of malignancy no family history of malignancy no images in 2023  [YearQuit] : 2004

## 2024-08-26 NOTE — PROCEDURE
[FreeTextEntry1] : 8/2024 normal pfts previous data reviewed:  ct chest 7/2024 ct chest  march 2022 ct cardiac 2021

## 2024-08-31 ENCOUNTER — RX RENEWAL (OUTPATIENT)
Age: 54
End: 2024-08-31